# Patient Record
Sex: FEMALE | Race: WHITE | NOT HISPANIC OR LATINO | Employment: OTHER | URBAN - METROPOLITAN AREA
[De-identification: names, ages, dates, MRNs, and addresses within clinical notes are randomized per-mention and may not be internally consistent; named-entity substitution may affect disease eponyms.]

---

## 2023-04-24 ENCOUNTER — OFFICE VISIT (OUTPATIENT)
Dept: PRIMARY CARE | Facility: CLINIC | Age: 88
End: 2023-04-24
Payer: COMMERCIAL

## 2023-04-24 VITALS
HEIGHT: 61 IN | SYSTOLIC BLOOD PRESSURE: 120 MMHG | RESPIRATION RATE: 18 BRPM | OXYGEN SATURATION: 94 % | WEIGHT: 146 LBS | HEART RATE: 83 BPM | BODY MASS INDEX: 27.56 KG/M2 | TEMPERATURE: 97.5 F | DIASTOLIC BLOOD PRESSURE: 70 MMHG

## 2023-04-24 DIAGNOSIS — E11.9 TYPE 2 DIABETES MELLITUS WITHOUT COMPLICATION, UNSPECIFIED WHETHER LONG TERM INSULIN USE (MULTI): Primary | ICD-10-CM

## 2023-04-24 DIAGNOSIS — G89.29 CHRONIC PAIN OF TOE OF RIGHT FOOT: ICD-10-CM

## 2023-04-24 DIAGNOSIS — I10 PRIMARY HYPERTENSION: ICD-10-CM

## 2023-04-24 DIAGNOSIS — M79.674 CHRONIC PAIN OF TOE OF RIGHT FOOT: ICD-10-CM

## 2023-04-24 DIAGNOSIS — M79.675 CHRONIC PAIN OF TOE OF LEFT FOOT: ICD-10-CM

## 2023-04-24 DIAGNOSIS — G89.29 CHRONIC PAIN OF TOE OF LEFT FOOT: ICD-10-CM

## 2023-04-24 DIAGNOSIS — E11.9 TYPE 2 DIABETES MELLITUS WITHOUT RETINOPATHY (MULTI): ICD-10-CM

## 2023-04-24 DIAGNOSIS — J43.8 OTHER EMPHYSEMA (MULTI): ICD-10-CM

## 2023-04-24 PROBLEM — R31.9 HEMATURIA: Status: ACTIVE | Noted: 2023-04-24

## 2023-04-24 PROBLEM — H35.371 EPIRETINAL MEMBRANE, RIGHT EYE: Status: ACTIVE | Noted: 2023-02-20

## 2023-04-24 PROBLEM — K76.0 FATTY LIVER: Status: ACTIVE | Noted: 2023-04-24

## 2023-04-24 PROBLEM — R42 DIZZINESS: Status: ACTIVE | Noted: 2023-04-24

## 2023-04-24 PROBLEM — M85.80 OSTEOPENIA: Status: ACTIVE | Noted: 2023-04-24

## 2023-04-24 PROBLEM — K21.9 GERD (GASTROESOPHAGEAL REFLUX DISEASE): Status: ACTIVE | Noted: 2023-04-24

## 2023-04-24 PROBLEM — J44.9 COPD (CHRONIC OBSTRUCTIVE PULMONARY DISEASE) (MULTI): Status: ACTIVE | Noted: 2023-04-24

## 2023-04-24 PROBLEM — M19.90 OSTEOARTHRITIS: Status: ACTIVE | Noted: 2023-04-24

## 2023-04-24 PROBLEM — M79.603 ARM PAIN: Status: ACTIVE | Noted: 2023-04-24

## 2023-04-24 PROBLEM — J06.9 URI (UPPER RESPIRATORY INFECTION): Status: ACTIVE | Noted: 2023-04-24

## 2023-04-24 PROBLEM — M25.632 STIFFNESS OF LEFT WRIST, NOT ELSEWHERE CLASSIFIED: Status: ACTIVE | Noted: 2020-02-18

## 2023-04-24 PROBLEM — H43.813 VITREOUS DEGENERATION OF BOTH EYES: Status: ACTIVE | Noted: 2023-02-20

## 2023-04-24 PROBLEM — M79.606 PAIN IN LOWER LIMB: Status: ACTIVE | Noted: 2023-04-24

## 2023-04-24 PROBLEM — T14.8XXA: Status: ACTIVE | Noted: 2023-04-24

## 2023-04-24 PROBLEM — M79.602 PAIN OF LEFT UPPER EXTREMITY: Status: ACTIVE | Noted: 2023-04-24

## 2023-04-24 PROBLEM — M54.50 LUMBAGO: Status: ACTIVE | Noted: 2023-04-24

## 2023-04-24 PROBLEM — B35.1 NAIL FUNGUS: Status: ACTIVE | Noted: 2023-04-24

## 2023-04-24 PROBLEM — R25.2 LEG CRAMPS: Status: ACTIVE | Noted: 2023-04-24

## 2023-04-24 PROBLEM — R09.89 CAROTID ARTERY BRUIT: Status: ACTIVE | Noted: 2023-04-24

## 2023-04-24 PROBLEM — R21 RASH AND OTHER NONSPECIFIC SKIN ERUPTION: Status: ACTIVE | Noted: 2023-04-24

## 2023-04-24 PROBLEM — M81.0 OSTEOPOROSIS: Status: ACTIVE | Noted: 2023-04-24

## 2023-04-24 PROBLEM — H35.363: Status: ACTIVE | Noted: 2023-02-20

## 2023-04-24 PROBLEM — Z96.1 PSEUDOPHAKIA: Status: ACTIVE | Noted: 2023-02-20

## 2023-04-24 PROBLEM — R11.2 NAUSEA WITH VOMITING: Status: ACTIVE | Noted: 2023-04-24

## 2023-04-24 PROBLEM — R06.02 SOB (SHORTNESS OF BREATH) ON EXERTION: Status: ACTIVE | Noted: 2023-04-24

## 2023-04-24 PROBLEM — L50.8 URTICARIA, ACUTE: Status: ACTIVE | Noted: 2023-04-24

## 2023-04-24 PROBLEM — R05.9 COUGH: Status: ACTIVE | Noted: 2023-04-24

## 2023-04-24 PROBLEM — E55.9 VITAMIN D DEFICIENCY: Status: ACTIVE | Noted: 2023-04-24

## 2023-04-24 PROBLEM — E78.5 DYSLIPIDEMIA: Status: ACTIVE | Noted: 2023-04-24

## 2023-04-24 PROBLEM — S52.539A CLOSED COLLES' FRACTURE: Status: ACTIVE | Noted: 2019-12-27

## 2023-04-24 LAB — POC HEMOGLOBIN A1C: 7.8 % (ref 4.2–6.5)

## 2023-04-24 PROCEDURE — 83036 HEMOGLOBIN GLYCOSYLATED A1C: CPT | Performed by: INTERNAL MEDICINE

## 2023-04-24 PROCEDURE — 3074F SYST BP LT 130 MM HG: CPT | Performed by: INTERNAL MEDICINE

## 2023-04-24 PROCEDURE — 1159F MED LIST DOCD IN RCRD: CPT | Performed by: INTERNAL MEDICINE

## 2023-04-24 PROCEDURE — 99213 OFFICE O/P EST LOW 20 MIN: CPT | Performed by: INTERNAL MEDICINE

## 2023-04-24 PROCEDURE — 1160F RVW MEDS BY RX/DR IN RCRD: CPT | Performed by: INTERNAL MEDICINE

## 2023-04-24 PROCEDURE — 3078F DIAST BP <80 MM HG: CPT | Performed by: INTERNAL MEDICINE

## 2023-04-24 RX ORDER — INSULIN GLARGINE 100 [IU]/ML
INJECTION, SOLUTION SUBCUTANEOUS
COMMUNITY
End: 2023-07-05 | Stop reason: SDUPTHER

## 2023-04-24 RX ORDER — BLOOD-GLUCOSE METER
EACH MISCELLANEOUS 2 TIMES DAILY
COMMUNITY
End: 2023-08-03 | Stop reason: SDUPTHER

## 2023-04-24 RX ORDER — FLUTICASONE PROPIONATE 50 MCG
SPRAY, SUSPENSION (ML) NASAL
COMMUNITY
Start: 2021-12-02 | End: 2023-07-24 | Stop reason: SDUPTHER

## 2023-04-24 RX ORDER — PEN NEEDLE, DIABETIC 31 GX5/16"
NEEDLE, DISPOSABLE MISCELLANEOUS
COMMUNITY
Start: 2022-12-28

## 2023-04-24 RX ORDER — METFORMIN HYDROCHLORIDE 1000 MG/1
1000 TABLET ORAL
COMMUNITY
Start: 2019-10-04 | End: 2023-07-24 | Stop reason: SDUPTHER

## 2023-04-24 RX ORDER — SIMVASTATIN 20 MG/1
TABLET, FILM COATED ORAL
COMMUNITY
Start: 2019-09-11 | End: 2023-07-24 | Stop reason: SDUPTHER

## 2023-04-24 RX ORDER — ACETAMINOPHEN 500 MG
TABLET ORAL
COMMUNITY

## 2023-04-24 RX ORDER — ALBUTEROL SULFATE 90 UG/1
AEROSOL, METERED RESPIRATORY (INHALATION)
COMMUNITY
Start: 2023-02-10 | End: 2023-12-18

## 2023-04-24 RX ORDER — ESOMEPRAZOLE MAGNESIUM 40 MG/1
CAPSULE, DELAYED RELEASE ORAL
COMMUNITY

## 2023-04-24 RX ORDER — FLUTICASONE PROPIONATE AND SALMETEROL 250; 50 UG/1; UG/1
POWDER RESPIRATORY (INHALATION) EVERY 12 HOURS
COMMUNITY
End: 2023-07-24 | Stop reason: SDUPTHER

## 2023-04-24 RX ORDER — LISINOPRIL 10 MG/1
TABLET ORAL EVERY 24 HOURS
COMMUNITY
End: 2023-07-24 | Stop reason: SDUPTHER

## 2023-04-24 RX ORDER — FAMOTIDINE 20 MG/1
TABLET, FILM COATED ORAL
COMMUNITY
Start: 2020-06-01 | End: 2023-04-24 | Stop reason: WASHOUT

## 2023-04-24 RX ORDER — CALCIUM CARBONATE/VITAMIN D3 600MG-5MCG
TABLET ORAL
COMMUNITY
End: 2024-06-03 | Stop reason: SDUPTHER

## 2023-04-24 RX ORDER — LIDOCAINE HCL 4 G/100ML
LIQUID TOPICAL
COMMUNITY
End: 2024-02-12 | Stop reason: ALTCHOICE

## 2023-04-24 ASSESSMENT — ENCOUNTER SYMPTOMS
COUGH: 1
CHILLS: 0
WEAKNESS: 0
WHEEZING: 1
UNEXPECTED WEIGHT CHANGE: 0
CHEST TIGHTNESS: 0
NAUSEA: 0
ARTHRALGIAS: 0
DIZZINESS: 1
CONSTIPATION: 0
HEADACHES: 1
PALPITATIONS: 0
ADENOPATHY: 0
SORE THROAT: 0
ABDOMINAL PAIN: 0
VOMITING: 0
SLEEP DISTURBANCE: 0
SHORTNESS OF BREATH: 1
DYSURIA: 0
FATIGUE: 1
DIARRHEA: 0
JOINT SWELLING: 0
CONFUSION: 0
SEIZURES: 0

## 2023-04-24 ASSESSMENT — PATIENT HEALTH QUESTIONNAIRE - PHQ9
1. LITTLE INTEREST OR PLEASURE IN DOING THINGS: NOT AT ALL
SUM OF ALL RESPONSES TO PHQ9 QUESTIONS 1 AND 2: 0
2. FEELING DOWN, DEPRESSED OR HOPELESS: NOT AT ALL

## 2023-04-24 NOTE — PROGRESS NOTES
Subjective   Lindsay Hauser is a 88 y.o. female who presents for Follow-up and Diabetes (Follow up DM- checking daily ,BID , 140s, 90 lowest , 190 highest).  Follow up DM- checking BG at home BID  Last HGBA1C was 7.8 on 1.10.23, today is 7.8  Headaches-took advil dizziness, R upper abd area sharp pain  1 week ago daily ,took nexium and helped  L foot edema on/off x 6 weeks , better today  only toes swelling now , bilateral big toes red and swelling , unknown reason            Diabetes  She presents for her follow-up diabetic visit. She has type 2 diabetes mellitus. Her disease course has been stable. Hypoglycemia symptoms include dizziness and headaches. Pertinent negatives for hypoglycemia include no confusion or seizures. Associated symptoms include fatigue. Pertinent negatives for diabetes include no weakness.       Review of Systems   Constitutional:  Positive for fatigue. Negative for chills and unexpected weight change.        Comment   HENT:  Negative for congestion, ear pain and sore throat.    Respiratory:  Positive for cough, shortness of breath and wheezing. Negative for chest tightness.    Cardiovascular:  Positive for leg swelling. Negative for palpitations.   Gastrointestinal:  Negative for abdominal pain, constipation, diarrhea, nausea and vomiting.   Genitourinary:  Negative for dysuria and urgency.   Musculoskeletal:  Negative for arthralgias and joint swelling.   Skin:  Negative for rash.   Neurological:  Positive for dizziness and headaches. Negative for seizures and weakness.   Hematological:  Negative for adenopathy.   Psychiatric/Behavioral:  Negative for confusion and sleep disturbance.        Objective   Physical Exam  Constitutional:       Appearance: Normal appearance.   HENT:      Head: Normocephalic and atraumatic.   Eyes:      Conjunctiva/sclera: Conjunctivae normal.   Neck:      Vascular: No carotid bruit.   Cardiovascular:      Rate and Rhythm: Normal rate and regular rhythm.      Heart  "sounds: No murmur heard.  Pulmonary:      Effort: No respiratory distress.      Breath sounds: No wheezing, rhonchi or rales.   Chest:      Chest wall: No tenderness.   Abdominal:      General: Bowel sounds are normal. There is no distension.      Palpations: Abdomen is soft. There is no mass.      Tenderness: There is no abdominal tenderness.   Musculoskeletal:         General: Normal range of motion.      Cervical back: Neck supple.   Feet:      Right foot:      Skin integrity: Skin integrity normal.      Toenail Condition: Right toenails are abnormally thick. Fungal disease present.     Left foot:      Skin integrity: Skin integrity normal.      Toenail Condition: Left toenails are abnormally thick. Fungal disease present.     Comments: Mild left leg edema ,foot level  Lymphadenopathy:      Cervical: No cervical adenopathy.   Skin:     Coloration: Skin is not jaundiced.      Findings: No rash.   Neurological:      General: No focal deficit present.      Mental Status: She is alert and oriented to person, place, and time. Mental status is at baseline.      Motor: No weakness.      Gait: Gait normal.   Psychiatric:         Mood and Affect: Mood normal.         Behavior: Behavior normal.         Judgment: Judgment normal.       /70 (BP Location: Left arm, Patient Position: Sitting)   Pulse 83   Temp 36.4 °C (97.5 °F)   Resp 18   Ht 1.549 m (5' 1\")   Wt 66.2 kg (146 lb)   SpO2 94% Comment: RA  BMI 27.59 kg/m²         Assessment/Plan   Problem List Items Addressed This Visit       Chronic pain of toe of left foot    Chronic pain of toe of right foot    COPD (chronic obstructive pulmonary disease) (CMS/[facility])     Stable on current med         Hypertension    Type 2 diabetes mellitus without retinopathy (CMS/[facility])     Other Visit Diagnoses       Type 2 diabetes mellitus without complication, unspecified whether long term insulin use (CMS/[facility])    -  Primary             "

## 2023-07-05 ENCOUNTER — TELEPHONE (OUTPATIENT)
Dept: PRIMARY CARE | Facility: CLINIC | Age: 88
End: 2023-07-05
Payer: COMMERCIAL

## 2023-07-05 DIAGNOSIS — E11.9 TYPE 2 DIABETES MELLITUS WITHOUT RETINOPATHY (MULTI): ICD-10-CM

## 2023-07-05 RX ORDER — INSULIN GLARGINE 100 [IU]/ML
INJECTION, SOLUTION SUBCUTANEOUS
Qty: 3 ML | Refills: 1 | Status: SHIPPED | OUTPATIENT
Start: 2023-07-05 | End: 2023-07-24 | Stop reason: SDUPTHER

## 2023-07-05 NOTE — TELEPHONE ENCOUNTER
Rx Refill Request Telephone Encounter    Name:  Lindsay Hauser  :  [id number]  Medication Name:  lantus solostar pen  Dose : 100 units (3ml)  Route : under the skin  Frequency : daily  Directions : inject 28 units subcutaneously at bedtime  Specific Pharmacy location:  [facility]  Date of last appointment:  2023  Date of next appointment:  2023  Best number to reach patient:  [phone]

## 2023-07-24 ENCOUNTER — OFFICE VISIT (OUTPATIENT)
Dept: PRIMARY CARE | Facility: CLINIC | Age: 88
End: 2023-07-24
Payer: COMMERCIAL

## 2023-07-24 VITALS
BODY MASS INDEX: 27.26 KG/M2 | DIASTOLIC BLOOD PRESSURE: 62 MMHG | RESPIRATION RATE: 18 BRPM | TEMPERATURE: 97.2 F | OXYGEN SATURATION: 90 % | HEIGHT: 61 IN | HEART RATE: 78 BPM | WEIGHT: 144.4 LBS | SYSTOLIC BLOOD PRESSURE: 120 MMHG

## 2023-07-24 DIAGNOSIS — Z00.00 MEDICARE ANNUAL WELLNESS VISIT, SUBSEQUENT: Primary | ICD-10-CM

## 2023-07-24 DIAGNOSIS — H61.20 WAX IN EAR: ICD-10-CM

## 2023-07-24 DIAGNOSIS — I10 PRIMARY HYPERTENSION: ICD-10-CM

## 2023-07-24 DIAGNOSIS — J41.0 SIMPLE CHRONIC BRONCHITIS (MULTI): ICD-10-CM

## 2023-07-24 DIAGNOSIS — E11.9 TYPE 2 DIABETES MELLITUS WITHOUT COMPLICATION, UNSPECIFIED WHETHER LONG TERM INSULIN USE (MULTI): ICD-10-CM

## 2023-07-24 DIAGNOSIS — E11.9 TYPE 2 DIABETES MELLITUS WITHOUT RETINOPATHY (MULTI): ICD-10-CM

## 2023-07-24 DIAGNOSIS — E78.5 DYSLIPIDEMIA: ICD-10-CM

## 2023-07-24 PROBLEM — J06.9 URI (UPPER RESPIRATORY INFECTION): Status: RESOLVED | Noted: 2023-04-24 | Resolved: 2023-07-24

## 2023-07-24 PROBLEM — R21 RASH AND OTHER NONSPECIFIC SKIN ERUPTION: Status: RESOLVED | Noted: 2023-04-24 | Resolved: 2023-07-24

## 2023-07-24 PROBLEM — L50.8 URTICARIA, ACUTE: Status: RESOLVED | Noted: 2023-04-24 | Resolved: 2023-07-24

## 2023-07-24 PROBLEM — R05.9 COUGH: Status: RESOLVED | Noted: 2023-04-24 | Resolved: 2023-07-24

## 2023-07-24 LAB — POC HEMOGLOBIN A1C: 7.6 % (ref 4.2–6.5)

## 2023-07-24 PROCEDURE — 99497 ADVNCD CARE PLAN 30 MIN: CPT | Performed by: INTERNAL MEDICINE

## 2023-07-24 PROCEDURE — 1159F MED LIST DOCD IN RCRD: CPT | Performed by: INTERNAL MEDICINE

## 2023-07-24 PROCEDURE — 3078F DIAST BP <80 MM HG: CPT | Performed by: INTERNAL MEDICINE

## 2023-07-24 PROCEDURE — 1170F FXNL STATUS ASSESSED: CPT | Performed by: INTERNAL MEDICINE

## 2023-07-24 PROCEDURE — 83036 HEMOGLOBIN GLYCOSYLATED A1C: CPT | Performed by: INTERNAL MEDICINE

## 2023-07-24 PROCEDURE — G0439 PPPS, SUBSEQ VISIT: HCPCS | Performed by: INTERNAL MEDICINE

## 2023-07-24 PROCEDURE — G0444 DEPRESSION SCREEN ANNUAL: HCPCS | Performed by: INTERNAL MEDICINE

## 2023-07-24 PROCEDURE — 3074F SYST BP LT 130 MM HG: CPT | Performed by: INTERNAL MEDICINE

## 2023-07-24 PROCEDURE — 1160F RVW MEDS BY RX/DR IN RCRD: CPT | Performed by: INTERNAL MEDICINE

## 2023-07-24 PROCEDURE — 99213 OFFICE O/P EST LOW 20 MIN: CPT | Performed by: INTERNAL MEDICINE

## 2023-07-24 RX ORDER — FLUTICASONE PROPIONATE AND SALMETEROL 250; 50 UG/1; UG/1
1 POWDER RESPIRATORY (INHALATION) EVERY 12 HOURS
Qty: 60 EACH | Refills: 11 | Status: SHIPPED | OUTPATIENT
Start: 2023-07-24

## 2023-07-24 RX ORDER — LISINOPRIL 10 MG/1
10 TABLET ORAL EVERY 24 HOURS
Qty: 30 TABLET | Refills: 11 | Status: SHIPPED | OUTPATIENT
Start: 2023-07-24

## 2023-07-24 RX ORDER — INSULIN GLARGINE 100 [IU]/ML
INJECTION, SOLUTION SUBCUTANEOUS
Qty: 3 ML | Refills: 11 | Status: SHIPPED | OUTPATIENT
Start: 2023-07-24 | End: 2023-10-30 | Stop reason: SDUPTHER

## 2023-07-24 RX ORDER — FLUTICASONE PROPIONATE 50 MCG
1 SPRAY, SUSPENSION (ML) NASAL
Qty: 16 G | Refills: 11 | Status: SHIPPED | OUTPATIENT
Start: 2023-07-24 | End: 2024-06-03 | Stop reason: SDUPTHER

## 2023-07-24 RX ORDER — SIMVASTATIN 20 MG/1
TABLET, FILM COATED ORAL
Qty: 30 TABLET | Refills: 11 | Status: SHIPPED | OUTPATIENT
Start: 2023-07-24 | End: 2023-07-27 | Stop reason: SDUPTHER

## 2023-07-24 RX ORDER — METFORMIN HYDROCHLORIDE 1000 MG/1
1000 TABLET ORAL
Qty: 60 TABLET | Refills: 11 | Status: SHIPPED | OUTPATIENT
Start: 2023-07-24

## 2023-07-24 ASSESSMENT — ENCOUNTER SYMPTOMS
DIARRHEA: 0
SHORTNESS OF BREATH: 1
CHILLS: 0
COUGH: 1
UNEXPECTED WEIGHT CHANGE: 0
PALPITATIONS: 0
CHEST TIGHTNESS: 0
ARTHRALGIAS: 0
CONFUSION: 0
NAUSEA: 0
DYSURIA: 0
JOINT SWELLING: 0
SORE THROAT: 0
VOMITING: 0
ADENOPATHY: 0
WHEEZING: 1
FATIGUE: 1
DIZZINESS: 0
CONSTIPATION: 0
SLEEP DISTURBANCE: 1
ABDOMINAL PAIN: 0
WEAKNESS: 0
HEADACHES: 1

## 2023-07-24 ASSESSMENT — ACTIVITIES OF DAILY LIVING (ADL)
DOING_HOUSEWORK: NEEDS ASSISTANCE
BATHING: INDEPENDENT
GROCERY_SHOPPING: NEEDS ASSISTANCE
DRESSING: INDEPENDENT
TAKING_MEDICATION: INDEPENDENT
MANAGING_FINANCES: NEEDS ASSISTANCE

## 2023-07-24 ASSESSMENT — PATIENT HEALTH QUESTIONNAIRE - PHQ9
SUM OF ALL RESPONSES TO PHQ9 QUESTIONS 1 AND 2: 0
1. LITTLE INTEREST OR PLEASURE IN DOING THINGS: NOT AT ALL
2. FEELING DOWN, DEPRESSED OR HOPELESS: NOT AT ALL

## 2023-07-24 NOTE — PROGRESS NOTES
"Subjective   Reason for Visit: Lindsay Hauser is an 88 y.o. female here for a Medicare Wellness visit.     Past Medical, Surgical, and Family History reviewed and updated in chart.    Reviewed all medications by prescribing practitioner or clinical pharmacist (such as prescriptions, OTCs, herbal therapies and supplements) and documented in the medical record.    AWV  DM - checking BG at home BID states is averaging    150s-180s  , last HGBA1C was 7.8 on 4.24.23, today is 7.6  Needs all RX  to be sent to mail order pharmacy please   Dexa- 3.8.2022  Has Oxygen at home to us PRN- 2L  Patient decline to have mammo and colonoscopy   Daughter Halley is in today with patient         Patient Care Team:  Nilson Berumen MD as PCP - General     Review of Systems   Constitutional:  Positive for fatigue. Negative for chills and unexpected weight change.        Comment   HENT:  Negative for congestion, ear pain and sore throat.    Respiratory:  Positive for cough, shortness of breath and wheezing. Negative for chest tightness.    Cardiovascular:  Positive for leg swelling. Negative for palpitations.   Gastrointestinal:  Negative for abdominal pain, constipation, diarrhea, nausea and vomiting.   Genitourinary:  Negative for dysuria and urgency.        Urinary incontinence   Musculoskeletal:  Negative for arthralgias and joint swelling.   Skin:  Positive for rash.   Neurological:  Positive for headaches. Negative for dizziness and weakness.   Hematological:  Negative for adenopathy.   Psychiatric/Behavioral:  Positive for sleep disturbance. Negative for confusion.    All other systems reviewed and are negative.      Objective   Vitals:  /62 (BP Location: Left arm, Patient Position: Sitting)   Pulse 78   Temp 36.2 °C (97.2 °F)   Resp 18   Ht 1.549 m (5' 1\")   Wt 65.5 kg (144 lb 6.4 oz)   SpO2 90% Comment: RA  BMI 27.28 kg/m²       Physical Exam  Constitutional:       Appearance: Normal appearance.   HENT:      Head: " Normocephalic and atraumatic.   Eyes:      Pupils: Pupils are equal, round, and reactive to light.   Cardiovascular:      Rate and Rhythm: Normal rate and regular rhythm.   Pulmonary:      Effort: Pulmonary effort is normal.      Breath sounds: Normal breath sounds.   Musculoskeletal:         General: Normal range of motion.      Cervical back: Normal range of motion and neck supple.      Right foot: No deformity, bunion or Charcot foot.      Left foot: No deformity, bunion or Charcot foot.   Feet:      Right foot:      Skin integrity: Skin integrity normal. No ulcer, callus or fissure.      Toenail Condition: Right toenails are normal.      Left foot:      Skin integrity: Skin integrity normal. No ulcer, callus or fissure.      Toenail Condition: Left toenails are normal.      Comments: Foot exam normal,   Skin:     General: Skin is warm.   Neurological:      General: No focal deficit present.      Mental Status: She is alert and oriented to person, place, and time.   Psychiatric:         Mood and Affect: Mood normal.         Behavior: Behavior normal.         Assessment/Plan   Problem List Items Addressed This Visit       COPD (chronic obstructive pulmonary disease) (CMS/[facility])    Overview     COPD (chronic obstructive pulmonary disease);         Relevant Medications    fluticasone (Flonase) 50 mcg/actuation nasal spray    fluticasone propion-salmeteroL (Advair Diskus) 250-50 mcg/dose diskus inhaler    Dyslipidemia    Overview     ldl 110, hdl 48;         Relevant Medications    simvastatin (Zocor) 20 mg tablet    Hypertension    Relevant Medications    lisinopril 10 mg tablet    Type 2 diabetes mellitus without retinopathy (CMS/[facility])    Overview     hba1c 7.3,  7.5, 7.6, 6.9, 7.3, 8.3, 8; hba1c 7.3,  7.5, 7.6, 6.9, 7.3, 8.3, 8,7; hba1c 7.3,  7.5, 7.6, 6.9, 7.3, 8.3, 8,7, 7.8;         Relevant Medications    insulin glargine (Lantus Solostar U-100 Insulin) 100 unit/mL (3 mL) pen    metFORMIN (Glucophage) 1,000 mg  tablet    Type 2 diabetes mellitus (CMS/[facility])    Overview     Comment on above: hba1c 7.3, 7.5, 7.6, 6.9, 7.3, 8.3, 8;  hba1c 7.3, 7.5, 7.6, 6.9, 7.3, 8.3, 8,7;  hba1c 7.3, 7.5, 7.6, 6.9, 7.3, 8.3, 8,7, 7.8;         Current Assessment & Plan     Hba1c 7.6         Relevant Orders    POCT glycosylated hemoglobin (Hb A1C) manually resulted    Medicare annual wellness visit, subsequent - Primary

## 2023-07-27 ENCOUNTER — TELEPHONE (OUTPATIENT)
Dept: PRIMARY CARE | Facility: CLINIC | Age: 88
End: 2023-07-27
Payer: COMMERCIAL

## 2023-07-27 DIAGNOSIS — E78.5 DYSLIPIDEMIA: ICD-10-CM

## 2023-07-27 RX ORDER — SIMVASTATIN 20 MG/1
TABLET, FILM COATED ORAL
Qty: 30 TABLET | Refills: 11 | Status: SHIPPED | OUTPATIENT
Start: 2023-07-27 | End: 2023-07-28 | Stop reason: SDUPTHER

## 2023-07-27 RX ORDER — SIMVASTATIN 20 MG/1
20 TABLET, FILM COATED ORAL DAILY
Qty: 30 TABLET | Refills: 11 | Status: CANCELLED | OUTPATIENT
Start: 2023-07-27

## 2023-07-27 NOTE — TELEPHONE ENCOUNTER
[facility] needs clarification on Simvastatin Rx that came over today.  There were no instructions on this Rx. Please call Pharmacist Jerrica at [phone]

## 2023-07-28 DIAGNOSIS — E78.5 DYSLIPIDEMIA: Primary | ICD-10-CM

## 2023-07-28 RX ORDER — SIMVASTATIN 20 MG/1
20 TABLET, FILM COATED ORAL NIGHTLY
Qty: 30 TABLET | Refills: 11 | Status: SHIPPED | OUTPATIENT
Start: 2023-07-28 | End: 2023-08-02 | Stop reason: SDUPTHER

## 2023-07-31 ENCOUNTER — APPOINTMENT (OUTPATIENT)
Dept: PRIMARY CARE | Facility: CLINIC | Age: 88
End: 2023-07-31
Payer: COMMERCIAL

## 2023-08-02 ENCOUNTER — TELEPHONE (OUTPATIENT)
Dept: PRIMARY CARE | Facility: CLINIC | Age: 88
End: 2023-08-02
Payer: COMMERCIAL

## 2023-08-02 DIAGNOSIS — E78.5 DYSLIPIDEMIA: Primary | ICD-10-CM

## 2023-08-03 DIAGNOSIS — E11.9 TYPE 2 DIABETES MELLITUS WITHOUT COMPLICATION, UNSPECIFIED WHETHER LONG TERM INSULIN USE (MULTI): Primary | ICD-10-CM

## 2023-08-03 RX ORDER — SIMVASTATIN 20 MG/1
20 TABLET, FILM COATED ORAL NIGHTLY
Qty: 90 TABLET | Refills: 3 | Status: SHIPPED | OUTPATIENT
Start: 2023-08-03

## 2023-08-04 RX ORDER — BLOOD-GLUCOSE METER
1 EACH MISCELLANEOUS 2 TIMES DAILY
Qty: 100 STRIP | Refills: 3 | Status: SHIPPED | OUTPATIENT
Start: 2023-08-04 | End: 2023-11-09

## 2023-08-07 ENCOUNTER — OFFICE VISIT (OUTPATIENT)
Dept: PRIMARY CARE | Facility: CLINIC | Age: 88
End: 2023-08-07
Payer: COMMERCIAL

## 2023-08-07 VITALS
SYSTOLIC BLOOD PRESSURE: 128 MMHG | HEART RATE: 72 BPM | BODY MASS INDEX: 27.08 KG/M2 | TEMPERATURE: 97 F | HEIGHT: 61 IN | DIASTOLIC BLOOD PRESSURE: 60 MMHG | WEIGHT: 143.4 LBS

## 2023-08-07 DIAGNOSIS — H61.20 WAX IN EAR: Primary | ICD-10-CM

## 2023-08-07 PROCEDURE — 3074F SYST BP LT 130 MM HG: CPT | Performed by: INTERNAL MEDICINE

## 2023-08-07 PROCEDURE — 1159F MED LIST DOCD IN RCRD: CPT | Performed by: INTERNAL MEDICINE

## 2023-08-07 PROCEDURE — 3078F DIAST BP <80 MM HG: CPT | Performed by: INTERNAL MEDICINE

## 2023-08-07 PROCEDURE — 69209 REMOVE IMPACTED EAR WAX UNI: CPT | Performed by: INTERNAL MEDICINE

## 2023-08-07 PROCEDURE — 99212 OFFICE O/P EST SF 10 MIN: CPT | Performed by: INTERNAL MEDICINE

## 2023-08-07 PROCEDURE — 1160F RVW MEDS BY RX/DR IN RCRD: CPT | Performed by: INTERNAL MEDICINE

## 2023-08-07 ASSESSMENT — ENCOUNTER SYMPTOMS
COUGH: 1
HEADACHES: 1
CARDIOVASCULAR NEGATIVE: 1
DIZZINESS: 0
SHORTNESS OF BREATH: 1
FATIGUE: 1

## 2023-08-07 ASSESSMENT — PATIENT HEALTH QUESTIONNAIRE - PHQ9
2. FEELING DOWN, DEPRESSED OR HOPELESS: NOT AT ALL
1. LITTLE INTEREST OR PLEASURE IN DOING THINGS: NOT AT ALL
SUM OF ALL RESPONSES TO PHQ9 QUESTIONS 1 AND 2: 0

## 2023-08-07 NOTE — PROGRESS NOTES
"Subjective   Lindsay Hauser is a 88 y.o. female who presents for Cerumen Impaction ( L ear wax).  Patient here today for L ear wax removal   Daughter in the room with patient       Review of Systems   Constitutional:  Positive for fatigue.   Respiratory:  Positive for cough and shortness of breath.    Cardiovascular: Negative.    Neurological:  Positive for headaches. Negative for dizziness.       Objective   Physical Exam  Constitutional:       Comments: Left ear wax, dry , deep     Patient ID: Lindsay Hauser is a 88 y.o. female.    Ear Cerumen Removal    Date/Time: 8/7/2023 4:20 PM    Performed by: Nilson Berumen MD  Authorized by: Nilson Berumen MD    Consent:     Consent obtained:  Verbal    Consent given by:  Patient    Risks, benefits, and alternatives were discussed: yes      Risks discussed:  Bleeding, infection, pain, dizziness, incomplete removal and TM perforation    Alternatives discussed:  No treatment and referral  Universal protocol:     Procedure explained and questions answered to patient or proxy's satisfaction: yes      Patient identity confirmed:  Verbally with patient  Procedure details:     Location:  L ear    Procedure type: irrigation      Procedure outcomes: unable to remove cerumen    Post-procedure details:     Inspection:  Some cerumen remaining    Hearing quality:  Diminished    Procedure completion:  Procedure terminated electively by provider  Comments:      Referral to ent, continue ear drops    /60 (BP Location: Left arm, Patient Position: Sitting)   Pulse 72   Temp 36.1 °C (97 °F)   Ht 1.549 m (5' 1\")   Wt 65 kg (143 lb 6.4 oz)   BMI 27.10 kg/m²       Assessment/Plan   Problem List Items Addressed This Visit       Wax in ear - Primary    Relevant Orders    Referral to ENT       "

## 2023-09-27 PROBLEM — Z87.891 FORMER CIGAR SMOKER: Status: ACTIVE | Noted: 2023-09-27

## 2023-09-27 RX ORDER — DOXYCYCLINE 100 MG/1
100 CAPSULE ORAL 2 TIMES DAILY
COMMUNITY
Start: 2023-01-09 | End: 2024-02-12 | Stop reason: ALTCHOICE

## 2023-09-27 RX ORDER — TRAMADOL HYDROCHLORIDE 50 MG/1
50 TABLET ORAL 3 TIMES DAILY PRN
COMMUNITY
Start: 2020-03-23 | End: 2024-02-12 | Stop reason: ALTCHOICE

## 2023-09-27 RX ORDER — NYSTATIN AND TRIAMCINOLONE ACETONIDE 100000; 1 [USP'U]/G; MG/G
OINTMENT TOPICAL 3 TIMES DAILY
COMMUNITY
Start: 2021-08-19 | End: 2024-06-03 | Stop reason: SDUPTHER

## 2023-09-27 RX ORDER — FAMOTIDINE 20 MG/1
20 TABLET, FILM COATED ORAL DAILY
COMMUNITY
Start: 2020-06-01

## 2023-10-02 ENCOUNTER — APPOINTMENT (OUTPATIENT)
Dept: PRIMARY CARE | Facility: CLINIC | Age: 88
End: 2023-10-02
Payer: COMMERCIAL

## 2023-10-30 ENCOUNTER — OFFICE VISIT (OUTPATIENT)
Dept: PRIMARY CARE | Facility: CLINIC | Age: 88
End: 2023-10-30
Payer: COMMERCIAL

## 2023-10-30 ENCOUNTER — APPOINTMENT (OUTPATIENT)
Dept: PRIMARY CARE | Facility: CLINIC | Age: 88
End: 2023-10-30
Payer: COMMERCIAL

## 2023-10-30 VITALS
BODY MASS INDEX: 27.94 KG/M2 | TEMPERATURE: 98.1 F | HEART RATE: 91 BPM | WEIGHT: 148 LBS | HEIGHT: 61 IN | SYSTOLIC BLOOD PRESSURE: 138 MMHG | DIASTOLIC BLOOD PRESSURE: 60 MMHG | OXYGEN SATURATION: 93 % | RESPIRATION RATE: 16 BRPM

## 2023-10-30 DIAGNOSIS — E78.5 DYSLIPIDEMIA: ICD-10-CM

## 2023-10-30 DIAGNOSIS — E11.9 TYPE 2 DIABETES MELLITUS WITHOUT RETINOPATHY (MULTI): Primary | ICD-10-CM

## 2023-10-30 DIAGNOSIS — I10 PRIMARY HYPERTENSION: ICD-10-CM

## 2023-10-30 DIAGNOSIS — E55.9 VITAMIN D DEFICIENCY: ICD-10-CM

## 2023-10-30 DIAGNOSIS — E11.22 TYPE 2 DIABETES MELLITUS WITH DIABETIC CHRONIC KIDNEY DISEASE, UNSPECIFIED CKD STAGE, UNSPECIFIED WHETHER LONG TERM INSULIN USE (MULTI): ICD-10-CM

## 2023-10-30 LAB — POC HEMOGLOBIN A1C: 8.5 % (ref 4.2–6.5)

## 2023-10-30 PROCEDURE — 99213 OFFICE O/P EST LOW 20 MIN: CPT | Performed by: INTERNAL MEDICINE

## 2023-10-30 PROCEDURE — 1159F MED LIST DOCD IN RCRD: CPT | Performed by: INTERNAL MEDICINE

## 2023-10-30 PROCEDURE — 83036 HEMOGLOBIN GLYCOSYLATED A1C: CPT | Performed by: INTERNAL MEDICINE

## 2023-10-30 PROCEDURE — 3075F SYST BP GE 130 - 139MM HG: CPT | Performed by: INTERNAL MEDICINE

## 2023-10-30 PROCEDURE — 3078F DIAST BP <80 MM HG: CPT | Performed by: INTERNAL MEDICINE

## 2023-10-30 PROCEDURE — 1160F RVW MEDS BY RX/DR IN RCRD: CPT | Performed by: INTERNAL MEDICINE

## 2023-10-30 RX ORDER — INSULIN GLARGINE 100 [IU]/ML
INJECTION, SOLUTION SUBCUTANEOUS
Qty: 3 ML | Refills: 11 | Status: SHIPPED | OUTPATIENT
Start: 2023-10-30 | End: 2024-02-29

## 2023-10-30 ASSESSMENT — ENCOUNTER SYMPTOMS
DIZZINESS: 0
SORE THROAT: 0
BLURRED VISION: 0
UNEXPECTED WEIGHT CHANGE: 0
COUGH: 0
DIARRHEA: 0
WEAKNESS: 0
VOMITING: 0
ARTHRALGIAS: 0
ADENOPATHY: 0
JOINT SWELLING: 0
WHEEZING: 0
NAUSEA: 0
SHORTNESS OF BREATH: 0
FATIGUE: 0
PALPITATIONS: 0
CONFUSION: 0
CHEST TIGHTNESS: 0
CONSTIPATION: 0
HYPERTENSION: 1
DYSURIA: 0
CHILLS: 0
SLEEP DISTURBANCE: 0
ABDOMINAL PAIN: 0

## 2023-10-30 ASSESSMENT — PATIENT HEALTH QUESTIONNAIRE - PHQ9
1. LITTLE INTEREST OR PLEASURE IN DOING THINGS: NOT AT ALL
2. FEELING DOWN, DEPRESSED OR HOPELESS: NOT AT ALL
SUM OF ALL RESPONSES TO PHQ9 QUESTIONS 1 AND 2: 0

## 2023-10-30 NOTE — PROGRESS NOTES
Subjective   Lindsay Hauser is a 88 y.o. female who presents for Diabetes and Hypertension.  Patient Last A1C 7.6%-07.24.23  Patient A1C Today is  Feels well, no sob, using o2 nc on/off  Medication rev, no side effects      Diabetes  She presents for her follow-up diabetic visit. She has type 2 diabetes mellitus. Her disease course has been stable. Pertinent negatives for hypoglycemia include no confusion or dizziness. Pertinent negatives for diabetes include no blurred vision, no chest pain, no fatigue and no weakness. Symptoms are stable.   Hypertension  This is a recurrent problem. The problem is controlled. Pertinent negatives include no blurred vision, chest pain, palpitations or shortness of breath.     Review of Systems   Constitutional:  Negative for chills, fatigue and unexpected weight change.        Comment   HENT:  Negative for congestion, ear pain and sore throat.    Eyes:  Negative for blurred vision.   Respiratory:  Negative for cough, chest tightness, shortness of breath and wheezing.    Cardiovascular:  Negative for chest pain, palpitations and leg swelling.   Gastrointestinal:  Negative for abdominal pain, constipation, diarrhea, nausea and vomiting.   Genitourinary:  Negative for dysuria and urgency.   Musculoskeletal:  Negative for arthralgias and joint swelling.   Skin:  Negative for rash.   Neurological:  Negative for dizziness and weakness.   Hematological:  Negative for adenopathy.   Psychiatric/Behavioral:  Negative for confusion and sleep disturbance.        Objective   Physical Exam  Constitutional:       Appearance: Normal appearance.   HENT:      Head: Normocephalic and atraumatic.   Eyes:      Pupils: Pupils are equal, round, and reactive to light.   Cardiovascular:      Rate and Rhythm: Normal rate and regular rhythm.   Pulmonary:      Effort: Pulmonary effort is normal.      Breath sounds: Normal breath sounds.   Musculoskeletal:         General: Normal range of motion.      Cervical  "back: Normal range of motion and neck supple.      Right foot: No deformity, bunion or Charcot foot.      Left foot: No deformity, bunion or Charcot foot.   Feet:      Right foot:      Skin integrity: Skin integrity normal. No ulcer, callus or fissure.      Toenail Condition: Right toenails are normal.      Left foot:      Skin integrity: Skin integrity normal. No ulcer, callus or fissure.      Toenail Condition: Left toenails are normal.      Comments: Foot exam normal,   Skin:     General: Skin is warm.   Neurological:      General: No focal deficit present.      Mental Status: She is alert and oriented to person, place, and time.   Psychiatric:         Mood and Affect: Mood normal.         Behavior: Behavior normal.       /60 (BP Location: Left arm, Patient Position: Sitting)   Pulse 91   Temp 36.7 °C (98.1 °F)   Resp 16   Ht 1.549 m (5' 1\")   Wt 67.1 kg (148 lb)   SpO2 93%   BMI 27.96 kg/m²       Assessment/Plan   Problem List Items Addressed This Visit       Dyslipidemia    Hypertension     Fair controlled         Type 2 diabetes mellitus without retinopathy (CMS/[facility]) - Primary    Relevant Orders    POCT glycosylated hemoglobin (Hb A1C) manually resulted (Completed)    Type 2 diabetes mellitus (CMS/[facility])     Hba1c 8.4 from 7.6  On lantus 28 in pm, will increase to 30 ui            "

## 2023-10-30 NOTE — PATIENT INSTRUCTIONS
Was nice seeing you today.  Continue same medication. But increase Lantuss to 30 ui in pm.Have lab work done before next appointment if labs were ordered today.  Fu in 3 month.  Call/ contact our office with any concerns.

## 2023-11-06 ENCOUNTER — PROCEDURE VISIT (OUTPATIENT)
Dept: PODIATRY | Facility: CLINIC | Age: 88
End: 2023-11-06
Payer: COMMERCIAL

## 2023-11-06 DIAGNOSIS — M79.674 CHRONIC PAIN OF TOE OF RIGHT FOOT: ICD-10-CM

## 2023-11-06 DIAGNOSIS — G89.29 CHRONIC PAIN OF TOE OF LEFT FOOT: Primary | ICD-10-CM

## 2023-11-06 DIAGNOSIS — G89.29 CHRONIC PAIN OF TOE OF RIGHT FOOT: ICD-10-CM

## 2023-11-06 DIAGNOSIS — M79.675 CHRONIC PAIN OF TOE OF LEFT FOOT: Primary | ICD-10-CM

## 2023-11-06 DIAGNOSIS — B35.1 NAIL FUNGUS: ICD-10-CM

## 2023-11-06 PROCEDURE — 11721 DEBRIDE NAIL 6 OR MORE: CPT | Performed by: PODIATRIST

## 2023-11-06 NOTE — PROGRESS NOTES
"History Of Present Illness  Lindsay Hauser is a 88 y.o. female presenting for diabetic nail care. Patient complains with painful elongated nails.     Past Medical History  She has no past medical history on file.    Surgical History  She has a past surgical history that includes Other surgical history (09/09/2019) and Other surgical history (09/26/2022).     Social History  She reports that she quit smoking about 6 years ago. Her smoking use included cigarettes. She has a 50.00 pack-year smoking history. She uses smokeless tobacco. She reports that she does not currently use alcohol. She reports that she does not use drugs.    Family History  No family history on file.     Allergies  Acetaminophen, Penicillins, and Sulfa (sulfonamide antibiotics)    Medications  Current Outpatient Medications   Medication Sig Dispense Refill    BD Ultra-Fine Mini Pen Needle 31 gauge x 3/16\" needle USE AS DIRECTED ONCE DAILY      calcium carbonate-vitamin D3 600 mg-5 mcg (200 unit) tablet Take by mouth.      carbamide peroxide (Debrox) 6.5 % otic solution Administer 5 drops into each ear 2 times a day. 15 mL 3    cholecalciferol (Vitamin D-3) 5,000 Units tablet Take by mouth.      diclofenac sodium 1 % kit Place on the skin.      doxycycline (Vibramycin) 100 mg capsule Take 1 capsule (100 mg) by mouth 2 times a day.      esomeprazole (NexIUM) 40 mg DR capsule Take by mouth.      famotidine (Pepcid) 20 mg tablet Take 1 tablet (20 mg) by mouth once daily.      fluticasone (Flonase) 50 mcg/actuation nasal spray Administer 1 spray into each nostril once daily. 16 g 11    fluticasone propion-salmeteroL (Advair Diskus) 250-50 mcg/dose diskus inhaler Inhale 1 puff every 12 hours. 60 each 11    insulin detemir (Levemir Flextouch) 100 unit/mL (3 mL) pen Inject under the skin.      insulin glargine (Lantus Solostar U-100 Insulin) 100 unit/mL (3 mL) pen INJECT 30 UNITS SUBCUTANEOUSLY AT BEDTIME *REPLACING LEVEMIR* 3 mL 11    lidocaine HCL 4 % " liquid roll-on Aspercreme Lidocaine 4 % External Liquid APPLY 1 INCH 3 times daily PRN Quantity: 0 Refills: 0 Ordered: 9-Sep-2019 DO Active      lisinopril 10 mg tablet Take 1 tablet (10 mg) by mouth once every 24 hours. 30 tablet 11    metFORMIN (Glucophage) 1,000 mg tablet Take 1 tablet (1,000 mg) by mouth 2 times a day with meals. 60 tablet 11    nystatin-triamcinolone (Mycolog II) ointment Apply topically 3 times a day.      OneTouch Verio test strips strip Inject 1 strip under the skin 2 times a day. 100 strip 3    simvastatin (Zocor) 20 mg tablet Take 1 tablet (20 mg) by mouth once daily at bedtime. Lindsay floasiu 90 tablet 3    traMADol (Ultram) 50 mg tablet Take 1 tablet (50 mg) by mouth 3 times a day as needed.      Ventolin HFA 90 mcg/actuation inhaler        No current facility-administered medications for this visit.       Review of Systems    REVIEW OF SYSTEMS  GENERAL:  Negative for malaise, significant weight loss, fever  CARDIOVASCULAR: leg swelling   MUSCULOSKELETAL:  Negative for joint pain or swelling, back pain, and muscle pain.  SKIN:  Negative for lesions, rash, and itching  PSYCH:  Negative for sleep disturbance, mood disorder and recent psychosocial stressors  NEURO: Negative, denies any burning, tingling or numbness     Objective:   Vasc: DP and PT pulses are palpable bilateral.  CFT is less than 3 seconds bilateral.  Skin temperature is warm to cool proximal to distal bilateral.      Neuro:  Light touch is intact to the foot bilateral.       Derm: Nails 1-5 bilateral are thickened, elongated and crumbly with subungual debris. Skin is supple with normal texture and turgor noted.  Webspaces are clean, dry and intact bilateral.  There are no hyperkeratoses, ulcerations, verruca or other lesions noted.      Ortho: Muscle strength is 5/5 for all pedal groups tested.  Ankle joint, subtalar joint, 1st MPJ and lesser MPJ ROM is full and without pain or crepitus.  The foot type is rectus bilateral off  weight bearing.  There are no structural deformities noted.    Assessment/Plan     Diagnoses and all orders for this visit:  Chronic pain of toe of left foot  Chronic pain of toe of right foot  Nail fungus      Toenails are debrided in length and thickness to avoid infection and for pain relief  Coco Escamilla-Jeana, DPM  [address] [place] [address]             Albertina Sierra, ANALI

## 2023-11-08 DIAGNOSIS — E11.9 TYPE 2 DIABETES MELLITUS WITHOUT COMPLICATION, UNSPECIFIED WHETHER LONG TERM INSULIN USE (MULTI): Primary | ICD-10-CM

## 2023-11-09 RX ORDER — BLOOD-GLUCOSE METER
EACH MISCELLANEOUS
Qty: 100 STRIP | Refills: 10 | Status: SHIPPED | OUTPATIENT
Start: 2023-11-09

## 2023-12-18 DIAGNOSIS — Z00.00 HEALTHCARE MAINTENANCE: Primary | ICD-10-CM

## 2023-12-18 RX ORDER — ALBUTEROL SULFATE 90 UG/1
AEROSOL, METERED RESPIRATORY (INHALATION)
Qty: 6.7 G | Refills: 10 | Status: SHIPPED | OUTPATIENT
Start: 2023-12-18

## 2024-02-12 ENCOUNTER — PROCEDURE VISIT (OUTPATIENT)
Dept: PODIATRY | Facility: CLINIC | Age: 89
End: 2024-02-12
Payer: COMMERCIAL

## 2024-02-12 ENCOUNTER — LAB (OUTPATIENT)
Dept: LAB | Facility: LAB | Age: 89
End: 2024-02-12
Payer: COMMERCIAL

## 2024-02-12 ENCOUNTER — OFFICE VISIT (OUTPATIENT)
Dept: PRIMARY CARE | Facility: CLINIC | Age: 89
End: 2024-02-12
Payer: COMMERCIAL

## 2024-02-12 VITALS
TEMPERATURE: 97.3 F | SYSTOLIC BLOOD PRESSURE: 110 MMHG | OXYGEN SATURATION: 93 % | WEIGHT: 147.8 LBS | BODY MASS INDEX: 27.9 KG/M2 | HEIGHT: 61 IN | RESPIRATION RATE: 18 BRPM | DIASTOLIC BLOOD PRESSURE: 60 MMHG

## 2024-02-12 DIAGNOSIS — E11.9 TYPE 2 DIABETES MELLITUS WITHOUT RETINOPATHY (MULTI): ICD-10-CM

## 2024-02-12 DIAGNOSIS — E11.9 TYPE 2 DIABETES MELLITUS WITHOUT RETINOPATHY (MULTI): Primary | ICD-10-CM

## 2024-02-12 DIAGNOSIS — G89.29 CHRONIC PAIN OF TOE OF RIGHT FOOT: ICD-10-CM

## 2024-02-12 DIAGNOSIS — I15.8 OTHER SECONDARY HYPERTENSION: ICD-10-CM

## 2024-02-12 DIAGNOSIS — R35.0 FREQUENCY OF URINATION: Primary | ICD-10-CM

## 2024-02-12 DIAGNOSIS — E55.9 VITAMIN D DEFICIENCY: ICD-10-CM

## 2024-02-12 DIAGNOSIS — E11.22 TYPE 2 DIABETES MELLITUS WITH DIABETIC CHRONIC KIDNEY DISEASE, UNSPECIFIED CKD STAGE, UNSPECIFIED WHETHER LONG TERM INSULIN USE (MULTI): ICD-10-CM

## 2024-02-12 DIAGNOSIS — M79.674 CHRONIC PAIN OF TOE OF RIGHT FOOT: ICD-10-CM

## 2024-02-12 DIAGNOSIS — J41.0 SIMPLE CHRONIC BRONCHITIS (MULTI): ICD-10-CM

## 2024-02-12 DIAGNOSIS — B35.1 NAIL FUNGUS: ICD-10-CM

## 2024-02-12 LAB
25(OH)D3 SERPL-MCNC: 46 NG/ML (ref 30–100)
APPEARANCE UR: ABNORMAL
BACTERIA #/AREA URNS AUTO: ABNORMAL /HPF
BASOPHILS # BLD AUTO: 0.05 X10*3/UL (ref 0–0.1)
BASOPHILS NFR BLD AUTO: 0.5 %
BILIRUB UR STRIP.AUTO-MCNC: NEGATIVE MG/DL
CHOLEST SERPL-MCNC: 181 MG/DL (ref 0–199)
CHOLESTEROL/HDL RATIO: 3.1
COLOR UR: YELLOW
CREAT UR-MCNC: 58.9 MG/DL (ref 20–320)
EOSINOPHIL # BLD AUTO: 0.26 X10*3/UL (ref 0–0.4)
EOSINOPHIL NFR BLD AUTO: 2.5 %
ERYTHROCYTE [DISTWIDTH] IN BLOOD BY AUTOMATED COUNT: 12.5 % (ref 11.5–14.5)
GLUCOSE UR STRIP.AUTO-MCNC: NEGATIVE MG/DL
HCT VFR BLD AUTO: 42 % (ref 36–46)
HDLC SERPL-MCNC: 57.8 MG/DL
HGB BLD-MCNC: 13.4 G/DL (ref 12–16)
IMM GRANULOCYTES # BLD AUTO: 0.03 X10*3/UL (ref 0–0.5)
IMM GRANULOCYTES NFR BLD AUTO: 0.3 % (ref 0–0.9)
KETONES UR STRIP.AUTO-MCNC: NEGATIVE MG/DL
LDLC SERPL CALC-MCNC: 96 MG/DL
LEUKOCYTE ESTERASE UR QL STRIP.AUTO: ABNORMAL
LYMPHOCYTES # BLD AUTO: 2.55 X10*3/UL (ref 0.8–3)
LYMPHOCYTES NFR BLD AUTO: 25 %
MAGNESIUM SERPL-MCNC: 1.73 MG/DL (ref 1.6–2.4)
MCH RBC QN AUTO: 28.8 PG (ref 26–34)
MCHC RBC AUTO-ENTMCNC: 31.9 G/DL (ref 32–36)
MCV RBC AUTO: 90 FL (ref 80–100)
MICROALBUMIN UR-MCNC: 15 MG/L
MICROALBUMIN/CREAT UR: 25.5 UG/MG CREAT
MONOCYTES # BLD AUTO: 0.93 X10*3/UL (ref 0.05–0.8)
MONOCYTES NFR BLD AUTO: 9.1 %
MUCOUS THREADS #/AREA URNS AUTO: ABNORMAL /LPF
NEUTROPHILS # BLD AUTO: 6.38 X10*3/UL (ref 1.6–5.5)
NEUTROPHILS NFR BLD AUTO: 62.6 %
NITRITE UR QL STRIP.AUTO: NEGATIVE
NON HDL CHOLESTEROL: 123 MG/DL (ref 0–149)
NRBC BLD-RTO: 0 /100 WBCS (ref 0–0)
PH UR STRIP.AUTO: 7 [PH]
PLATELET # BLD AUTO: 315 X10*3/UL (ref 150–450)
POC HEMOGLOBIN A1C: 8.3 % (ref 4.2–6.5)
PROT UR STRIP.AUTO-MCNC: NEGATIVE MG/DL
RBC # BLD AUTO: 4.65 X10*6/UL (ref 4–5.2)
RBC # UR STRIP.AUTO: NEGATIVE /UL
RBC #/AREA URNS AUTO: ABNORMAL /HPF
SP GR UR STRIP.AUTO: 1.01
SQUAMOUS #/AREA URNS AUTO: ABNORMAL /HPF
TRIGL SERPL-MCNC: 134 MG/DL (ref 0–149)
TSH SERPL-ACNC: 0.54 MIU/L (ref 0.44–3.98)
UROBILINOGEN UR STRIP.AUTO-MCNC: <2 MG/DL
VIT B12 SERPL-MCNC: 330 PG/ML (ref 211–911)
VLDL: 27 MG/DL (ref 0–40)
WBC # BLD AUTO: 10.2 X10*3/UL (ref 4.4–11.3)
WBC #/AREA URNS AUTO: ABNORMAL /HPF

## 2024-02-12 PROCEDURE — 81001 URINALYSIS AUTO W/SCOPE: CPT

## 2024-02-12 PROCEDURE — 99214 OFFICE O/P EST MOD 30 MIN: CPT | Performed by: INTERNAL MEDICINE

## 2024-02-12 PROCEDURE — 83735 ASSAY OF MAGNESIUM: CPT

## 2024-02-12 PROCEDURE — 80061 LIPID PANEL: CPT

## 2024-02-12 PROCEDURE — 1160F RVW MEDS BY RX/DR IN RCRD: CPT | Performed by: INTERNAL MEDICINE

## 2024-02-12 PROCEDURE — 3074F SYST BP LT 130 MM HG: CPT | Performed by: INTERNAL MEDICINE

## 2024-02-12 PROCEDURE — 82607 VITAMIN B-12: CPT

## 2024-02-12 PROCEDURE — 84443 ASSAY THYROID STIM HORMONE: CPT

## 2024-02-12 PROCEDURE — 36415 COLL VENOUS BLD VENIPUNCTURE: CPT

## 2024-02-12 PROCEDURE — 11721 DEBRIDE NAIL 6 OR MORE: CPT | Performed by: PODIATRIST

## 2024-02-12 PROCEDURE — 86803 HEPATITIS C AB TEST: CPT

## 2024-02-12 PROCEDURE — 1159F MED LIST DOCD IN RCRD: CPT | Performed by: INTERNAL MEDICINE

## 2024-02-12 PROCEDURE — 3078F DIAST BP <80 MM HG: CPT | Performed by: INTERNAL MEDICINE

## 2024-02-12 PROCEDURE — 83036 HEMOGLOBIN GLYCOSYLATED A1C: CPT | Performed by: INTERNAL MEDICINE

## 2024-02-12 PROCEDURE — 85025 COMPLETE CBC W/AUTO DIFF WBC: CPT

## 2024-02-12 PROCEDURE — 82306 VITAMIN D 25 HYDROXY: CPT

## 2024-02-12 PROCEDURE — 82570 ASSAY OF URINE CREATININE: CPT

## 2024-02-12 PROCEDURE — 82043 UR ALBUMIN QUANTITATIVE: CPT

## 2024-02-12 NOTE — PROGRESS NOTES
"History Of Present Illness  Lindsay Hauser is a 88 y.o. female presenting for diabetic nail care. Patient complains with painful elongated nails.     Past Medical History  She has no past medical history on file.    Surgical History  She has a past surgical history that includes Other surgical history (09/09/2019) and Other surgical history (09/26/2022).     Social History  She reports that she quit smoking about 7 years ago. Her smoking use included cigarettes. She has a 50.00 pack-year smoking history. She uses smokeless tobacco. She reports that she does not currently use alcohol. She reports that she does not use drugs.    Family History  No family history on file.     Allergies  Acetaminophen, Penicillins, and Sulfa (sulfonamide antibiotics)    Medications  Current Outpatient Medications   Medication Sig Dispense Refill    albuterol 90 mcg/actuation inhaler INHALE ONE (1) PUFF BY MOUTH EVERY 6 HOURS AS NEEDED 6.7 g 10    BD Ultra-Fine Mini Pen Needle 31 gauge x 3/16\" needle USE AS DIRECTED ONCE DAILY      calcium carbonate-vitamin D3 600 mg-5 mcg (200 unit) tablet Take by mouth.      cholecalciferol (Vitamin D-3) 5,000 Units tablet Take by mouth.      diclofenac sodium 1 % kit Place on the skin.      doxycycline (Vibramycin) 100 mg capsule Take 1 capsule (100 mg) by mouth 2 times a day.      esomeprazole (NexIUM) 40 mg DR capsule Take by mouth.      famotidine (Pepcid) 20 mg tablet Take 1 tablet (20 mg) by mouth once daily.      fluticasone (Flonase) 50 mcg/actuation nasal spray Administer 1 spray into each nostril once daily. 16 g 11    fluticasone propion-salmeteroL (Advair Diskus) 250-50 mcg/dose diskus inhaler Inhale 1 puff every 12 hours. 60 each 11    insulin detemir (Levemir Flextouch) 100 unit/mL (3 mL) pen Inject under the skin.      insulin glargine (Lantus Solostar U-100 Insulin) 100 unit/mL (3 mL) pen INJECT 30 UNITS SUBCUTANEOUSLY AT BEDTIME *REPLACING LEVEMIR* 3 mL 11    lidocaine HCL 4 % liquid " roll-on Aspercreme Lidocaine 4 % External Liquid APPLY 1 INCH 3 times daily PRN Quantity: 0 Refills: 0 Ordered: 9-Sep-2019 DO Active      lisinopril 10 mg tablet Take 1 tablet (10 mg) by mouth once every 24 hours. 30 tablet 11    metFORMIN (Glucophage) 1,000 mg tablet Take 1 tablet (1,000 mg) by mouth 2 times a day with meals. 60 tablet 11    nystatin-triamcinolone (Mycolog II) ointment Apply topically 3 times a day.      OneTouch Verio test strips strip USE TO TEST BLOOD SUGAR TWICE DAILY 100 strip 10    simvastatin (Zocor) 20 mg tablet Take 1 tablet (20 mg) by mouth once daily at bedtime. Lindsay floasiu 90 tablet 3    traMADol (Ultram) 50 mg tablet Take 1 tablet (50 mg) by mouth 3 times a day as needed.       No current facility-administered medications for this visit.       Review of Systems    REVIEW OF SYSTEMS  GENERAL:  Negative for malaise, significant weight loss, fever  CARDIOVASCULAR: leg swelling   MUSCULOSKELETAL:  Negative for joint pain or swelling, back pain, and muscle pain.  SKIN:  Negative for lesions, rash, and itching  PSYCH:  Negative for sleep disturbance, mood disorder and recent psychosocial stressors  NEURO: Negative, denies any burning, tingling or numbness     Objective:   Vasc: DP and PT pulses are palpable bilateral.  CFT is less than 3 seconds bilateral.  Skin temperature is warm to cool proximal to distal bilateral.      Neuro:  Light touch is intact to the foot bilateral.       Derm: Nails 1-5 bilateral are thickened, elongated and crumbly with subungual debris. Ingrown first bilateral. Skin is supple with normal texture and turgor noted.  Webspaces are clean, dry and intact bilateral.  There are no hyperkeratoses, ulcerations, verruca or other lesions noted.      Ortho: Muscle strength is 5/5 for all pedal groups tested.  Ankle joint, subtalar joint, 1st MPJ and lesser MPJ ROM is full and without pain or crepitus.  The foot type is rectus bilateral off weight bearing.  There are no  structural deformities noted.    Assessment/Plan     DM  Painful nail mycosis        Toenails are debrided in length and thickness to avoid infection and for pain relief  Coco Meija, GABRIELLA  [address] [place] [address]             Albertina Sierra, CMA

## 2024-02-12 NOTE — PATIENT INSTRUCTIONS
Was nice seeing you today.  Continue same medication.  Have lab work done before next appointment if labs were ordered today.  Fu in 3 month.  Call/ contact our office with any concerns.  Increase lantus to 34 ui  If you have labs or test done and you can't see the report in your chart or you didn't here from us in 2 weeks after test/labs done , please, call our office for reports.  Please , do not assume that they were normal.

## 2024-02-12 NOTE — PROGRESS NOTES
"Subjective   Lindsay Hauser is a 88 y.o. female who presents for UTI (UTI x 2 weeks symptoms ).  Labs went today to have it done, reports not available yet    Possible UTI- the last  2 weeks,  pain while urinating , red color , frequency , burning pain, could not urinate just 1 drop and on the pad did see the red color and later pink ,  took AZO UTI   and pain relief OTC  AZO max strength  to help with pain, the pain is better ,no more red color ,got better   Daughter  Halley in the room with patient today   DM-last HGBA1C was 8.5 on 10.30.23 , today is 8.3  Checking BG at home BID   This AM - BG was 110  Yesterday had amandeep  at home from insurance and BP was good but oxygen level was low, advised her to put on the oxygen 2 L ,usually  Q 6 months up to 1 year       Review of Systems   All other systems reviewed and are negative.      Objective   Physical Exam  Constitutional:       Appearance: Normal appearance.   HENT:      Head: Normocephalic and atraumatic.   Eyes:      Pupils: Pupils are equal, round, and reactive to light.   Cardiovascular:      Rate and Rhythm: Normal rate and regular rhythm.   Pulmonary:      Effort: Pulmonary effort is normal.      Breath sounds: Normal breath sounds.   Musculoskeletal:         General: Normal range of motion.      Cervical back: Normal range of motion and neck supple.   Skin:     General: Skin is warm.   Neurological:      General: No focal deficit present.      Mental Status: She is alert and oriented to person, place, and time.   Psychiatric:         Mood and Affect: Mood normal.         Behavior: Behavior normal.       /60 (BP Location: Left arm, Patient Position: Sitting)   Temp 36.3 °C (97.3 °F)   Resp 18   Ht 1.549 m (5' 1\")   Wt 67 kg (147 lb 12.8 oz)   SpO2 93% Comment: RA  BMI 27.93 kg/m²       Assessment/Plan   Problem List Items Addressed This Visit       COPD (chronic obstructive pulmonary disease) (CMS/[facility])    Hypertension    Type 2 diabetes mellitus " without retinopathy (CMS/[facility])     Hba1c 8.3  On lantus 32 ui,  will increase to 34 ui         Relevant Orders    POCT glycosylated hemoglobin (Hb A1C) manually resulted (Completed)    Frequency of urination - Primary    Relevant Orders    Urine Culture    Type 2 diabetes mellitus with diabetic chronic kidney disease, unspecified CKD stage, unspecified whether long term insulin use (CMS/[facility])

## 2024-02-13 LAB — HCV AB SER QL: NONREACTIVE

## 2024-02-28 DIAGNOSIS — E11.9 TYPE 2 DIABETES MELLITUS WITHOUT RETINOPATHY (MULTI): Primary | ICD-10-CM

## 2024-02-29 RX ORDER — INSULIN GLARGINE 100 [IU]/ML
INJECTION, SOLUTION SUBCUTANEOUS
Qty: 15 ML | Refills: 10 | Status: SHIPPED | OUTPATIENT
Start: 2024-02-29

## 2024-03-11 ENCOUNTER — APPOINTMENT (OUTPATIENT)
Dept: PRIMARY CARE | Facility: CLINIC | Age: 89
End: 2024-03-11
Payer: COMMERCIAL

## 2024-04-22 ENCOUNTER — APPOINTMENT (OUTPATIENT)
Dept: PODIATRY | Facility: CLINIC | Age: 89
End: 2024-04-22
Payer: COMMERCIAL

## 2024-04-29 ENCOUNTER — PROCEDURE VISIT (OUTPATIENT)
Dept: PODIATRY | Facility: CLINIC | Age: 89
End: 2024-04-29
Payer: COMMERCIAL

## 2024-04-29 DIAGNOSIS — M79.675 CHRONIC PAIN OF TOE OF LEFT FOOT: ICD-10-CM

## 2024-04-29 DIAGNOSIS — M79.674 CHRONIC PAIN OF TOE OF RIGHT FOOT: ICD-10-CM

## 2024-04-29 DIAGNOSIS — G89.29 CHRONIC PAIN OF TOE OF LEFT FOOT: ICD-10-CM

## 2024-04-29 DIAGNOSIS — E11.9 TYPE 2 DIABETES MELLITUS WITHOUT COMPLICATION, WITHOUT LONG-TERM CURRENT USE OF INSULIN (MULTI): Primary | ICD-10-CM

## 2024-04-29 DIAGNOSIS — G89.29 CHRONIC PAIN OF TOE OF RIGHT FOOT: ICD-10-CM

## 2024-04-29 DIAGNOSIS — B35.1 NAIL FUNGUS: ICD-10-CM

## 2024-04-29 DIAGNOSIS — L60.0 INGROWN TOENAIL: ICD-10-CM

## 2024-04-29 PROCEDURE — 99212 OFFICE O/P EST SF 10 MIN: CPT | Performed by: PODIATRIST

## 2024-04-29 NOTE — PROGRESS NOTES
"History Of Present Illness  Lindsay Hauser is a 89 y.o. female presenting for diabetic nail care. Patient complains with painful elongated nails.     Past Medical History  She has no past medical history on file.    Surgical History  She has a past surgical history that includes Other surgical history (09/09/2019) and Other surgical history (09/26/2022).     Social History  She reports that she quit smoking about 7 years ago. Her smoking use included cigarettes. She started smoking about 57 years ago. She has a 50 pack-year smoking history. She uses smokeless tobacco. She reports that she does not currently use alcohol. She reports that she does not use drugs.    Family History  No family history on file.     Allergies  Acetaminophen, Penicillins, and Sulfa (sulfonamide antibiotics)    Medications  Current Outpatient Medications   Medication Sig Dispense Refill    albuterol 90 mcg/actuation inhaler INHALE ONE (1) PUFF BY MOUTH EVERY 6 HOURS AS NEEDED 6.7 g 10    BD Ultra-Fine Mini Pen Needle 31 gauge x 3/16\" needle USE AS DIRECTED ONCE DAILY      calcium carbonate-vitamin D3 600 mg-5 mcg (200 unit) tablet Take by mouth.      cholecalciferol (Vitamin D-3) 5,000 Units tablet Take by mouth.      diclofenac sodium 1 % kit Place on the skin.      esomeprazole (NexIUM) 40 mg DR capsule Take by mouth.      famotidine (Pepcid) 20 mg tablet Take 1 tablet (20 mg) by mouth once daily.      fluticasone (Flonase) 50 mcg/actuation nasal spray Administer 1 spray into each nostril once daily. 16 g 11    fluticasone propion-salmeteroL (Advair Diskus) 250-50 mcg/dose diskus inhaler Inhale 1 puff every 12 hours. 60 each 11    insulin glargine (Lantus Solostar U-100 Insulin) 100 unit/mL (3 mL) pen INJECT UP TO 30 UNITS SUBCUTANEOUSLY AT BEDTIME *REPLACING LEVEMIR* 15 mL 10    lisinopril 10 mg tablet Take 1 tablet (10 mg) by mouth once every 24 hours. 30 tablet 11    metFORMIN (Glucophage) 1,000 mg tablet Take 1 tablet (1,000 mg) by mouth " 2 times a day with meals. 60 tablet 11    nystatin-triamcinolone (Mycolog II) ointment Apply topically 3 times a day.      OneTouch Verio test strips strip USE TO TEST BLOOD SUGAR TWICE DAILY 100 strip 10    simvastatin (Zocor) 20 mg tablet Take 1 tablet (20 mg) by mouth once daily at bedtime. Lindsay floasiu 90 tablet 3     No current facility-administered medications for this visit.       Review of Systems    REVIEW OF SYSTEMS  GENERAL:  Negative for malaise, significant weight loss, fever  CARDIOVASCULAR: leg swelling   MUSCULOSKELETAL:  Negative for joint pain or swelling, back pain, and muscle pain.  SKIN:  Negative for lesions, rash, and itching  PSYCH:  Negative for sleep disturbance, mood disorder and recent psychosocial stressors  NEURO: Negative, denies any burning, tingling or numbness     Objective:   Vasc: DP and PT pulses are palpable bilateral.  CFT is less than 3 seconds bilateral.  Skin temperature is warm to cool proximal to distal bilateral.      Neuro:  Light touch is intact to the foot bilateral.       Derm: Nails 1-5 bilateral are thickened, elongated and crumbly with subungual debris. Ingrown first bilateral. Skin is supple with normal texture and turgor noted.  Webspaces are clean, dry and intact bilateral.  First toenails bilateral are ingrown and incurvated and very tender to touch  Ortho: Muscle strength is 5/5 for all pedal groups tested.  Ankle joint, subtalar joint, 1st MPJ and lesser MPJ ROM is full and without pain or crepitus.  The foot type is rectus bilateral off weight bearing.  There are no structural deformities noted.    Assessment/Plan     DM  Painful nail mycosis  Painful ingrown toenails      Toenails are debrided in length and thickness to avoid infection and for pain relief    Excised nail borders.  We discussed the option of permanent toenail avulsion secondary to patient's chronic pain.  Patient will consider and follow-up with me as needed.    Coco Escamilla-Jeana,  DPM  [address] [place] [address]             Albertina Sierra, ANALI

## 2024-05-20 ENCOUNTER — APPOINTMENT (OUTPATIENT)
Dept: PRIMARY CARE | Facility: CLINIC | Age: 89
End: 2024-05-20
Payer: COMMERCIAL

## 2024-06-03 ENCOUNTER — OFFICE VISIT (OUTPATIENT)
Dept: PRIMARY CARE | Facility: CLINIC | Age: 89
End: 2024-06-03
Payer: COMMERCIAL

## 2024-06-03 VITALS
HEIGHT: 61 IN | WEIGHT: 144 LBS | TEMPERATURE: 96.4 F | OXYGEN SATURATION: 90 % | HEART RATE: 87 BPM | RESPIRATION RATE: 16 BRPM | DIASTOLIC BLOOD PRESSURE: 54 MMHG | BODY MASS INDEX: 27.19 KG/M2 | SYSTOLIC BLOOD PRESSURE: 110 MMHG

## 2024-06-03 DIAGNOSIS — J41.0 SIMPLE CHRONIC BRONCHITIS (MULTI): ICD-10-CM

## 2024-06-03 DIAGNOSIS — I10 PRIMARY HYPERTENSION: Primary | ICD-10-CM

## 2024-06-03 DIAGNOSIS — M81.0 AGE-RELATED OSTEOPOROSIS WITHOUT CURRENT PATHOLOGICAL FRACTURE: ICD-10-CM

## 2024-06-03 DIAGNOSIS — E78.5 DYSLIPIDEMIA: ICD-10-CM

## 2024-06-03 DIAGNOSIS — R25.2 LEG CRAMPS: ICD-10-CM

## 2024-06-03 DIAGNOSIS — E11.22 TYPE 2 DIABETES MELLITUS WITH DIABETIC CHRONIC KIDNEY DISEASE, UNSPECIFIED CKD STAGE, UNSPECIFIED WHETHER LONG TERM INSULIN USE (MULTI): ICD-10-CM

## 2024-06-03 DIAGNOSIS — E11.00 TYPE 2 DIABETES MELLITUS WITH HYPEROSMOLARITY WITHOUT COMA, WITHOUT LONG-TERM CURRENT USE OF INSULIN (MULTI): ICD-10-CM

## 2024-06-03 DIAGNOSIS — M85.80 OSTEOPENIA, UNSPECIFIED LOCATION: ICD-10-CM

## 2024-06-03 DIAGNOSIS — Z00.00 MEDICARE ANNUAL WELLNESS VISIT, SUBSEQUENT: ICD-10-CM

## 2024-06-03 DIAGNOSIS — Z13.9 SCREENING DUE: ICD-10-CM

## 2024-06-03 LAB — POC HEMOGLOBIN A1C: 7.5 % (ref 4.2–6.5)

## 2024-06-03 PROCEDURE — 1036F TOBACCO NON-USER: CPT | Performed by: INTERNAL MEDICINE

## 2024-06-03 PROCEDURE — 3078F DIAST BP <80 MM HG: CPT | Performed by: INTERNAL MEDICINE

## 2024-06-03 PROCEDURE — 1170F FXNL STATUS ASSESSED: CPT | Performed by: INTERNAL MEDICINE

## 2024-06-03 PROCEDURE — 3074F SYST BP LT 130 MM HG: CPT | Performed by: INTERNAL MEDICINE

## 2024-06-03 PROCEDURE — 1160F RVW MEDS BY RX/DR IN RCRD: CPT | Performed by: INTERNAL MEDICINE

## 2024-06-03 PROCEDURE — 83036 HEMOGLOBIN GLYCOSYLATED A1C: CPT | Performed by: INTERNAL MEDICINE

## 2024-06-03 PROCEDURE — 1158F ADVNC CARE PLAN TLK DOCD: CPT | Performed by: INTERNAL MEDICINE

## 2024-06-03 PROCEDURE — 99497 ADVNCD CARE PLAN 30 MIN: CPT | Performed by: INTERNAL MEDICINE

## 2024-06-03 PROCEDURE — G0439 PPPS, SUBSEQ VISIT: HCPCS | Performed by: INTERNAL MEDICINE

## 2024-06-03 PROCEDURE — 1159F MED LIST DOCD IN RCRD: CPT | Performed by: INTERNAL MEDICINE

## 2024-06-03 PROCEDURE — 1123F ACP DISCUSS/DSCN MKR DOCD: CPT | Performed by: INTERNAL MEDICINE

## 2024-06-03 RX ORDER — MINERAL OIL
180 ENEMA (ML) RECTAL DAILY
Qty: 30 TABLET | Refills: 5 | Status: SHIPPED | OUTPATIENT
Start: 2024-06-03 | End: 2025-06-03

## 2024-06-03 RX ORDER — FLUTICASONE PROPIONATE 50 MCG
1 SPRAY, SUSPENSION (ML) NASAL
Qty: 9.9 ML | Refills: 11 | Status: SHIPPED | OUTPATIENT
Start: 2024-06-03

## 2024-06-03 RX ORDER — CALCIUM CARBONATE/VITAMIN D3 600MG-5MCG
2 TABLET ORAL DAILY
Qty: 60 TABLET | Refills: 11 | Status: SHIPPED | OUTPATIENT
Start: 2024-06-03

## 2024-06-03 RX ORDER — FLUTICASONE PROPIONATE 50 MCG
1 SPRAY, SUSPENSION (ML) NASAL
Qty: 16 G | Refills: 11 | Status: SHIPPED | OUTPATIENT
Start: 2024-06-03

## 2024-06-03 RX ORDER — GUAIFENESIN 600 MG/1
1200 TABLET, EXTENDED RELEASE ORAL 2 TIMES DAILY
Qty: 60 TABLET | Refills: 3 | Status: SHIPPED | OUTPATIENT
Start: 2024-06-03 | End: 2025-06-03

## 2024-06-03 RX ORDER — NYSTATIN AND TRIAMCINOLONE ACETONIDE 100000; 1 [USP'U]/G; MG/G
OINTMENT TOPICAL 3 TIMES DAILY
Qty: 60 G | Refills: 3 | Status: SHIPPED | OUTPATIENT
Start: 2024-06-03

## 2024-06-03 ASSESSMENT — ENCOUNTER SYMPTOMS
SHORTNESS OF BREATH: 1
DYSURIA: 0
COUGH: 1
ADENOPATHY: 0
WEAKNESS: 0
ARTHRALGIAS: 0
DIZZINESS: 0
PALPITATIONS: 0
SLEEP DISTURBANCE: 0
CONFUSION: 0
ABDOMINAL PAIN: 0
WHEEZING: 0
UNEXPECTED WEIGHT CHANGE: 0
DIARRHEA: 0
NAUSEA: 0
JOINT SWELLING: 0
CHEST TIGHTNESS: 0
FATIGUE: 0
CONSTIPATION: 0
VOMITING: 0
CHILLS: 0
SORE THROAT: 0

## 2024-06-03 ASSESSMENT — PATIENT HEALTH QUESTIONNAIRE - PHQ9
10. IF YOU CHECKED OFF ANY PROBLEMS, HOW DIFFICULT HAVE THESE PROBLEMS MADE IT FOR YOU TO DO YOUR WORK, TAKE CARE OF THINGS AT HOME, OR GET ALONG WITH OTHER PEOPLE: SOMEWHAT DIFFICULT
1. LITTLE INTEREST OR PLEASURE IN DOING THINGS: NOT AT ALL
SUM OF ALL RESPONSES TO PHQ9 QUESTIONS 1 AND 2: 1
2. FEELING DOWN, DEPRESSED OR HOPELESS: SEVERAL DAYS

## 2024-06-03 ASSESSMENT — ACTIVITIES OF DAILY LIVING (ADL)
MANAGING_FINANCES: INDEPENDENT
TAKING_MEDICATION: INDEPENDENT
GROCERY_SHOPPING: INDEPENDENT
BATHING: INDEPENDENT
DRESSING: INDEPENDENT
DOING_HOUSEWORK: INDEPENDENT

## 2024-06-03 NOTE — PROGRESS NOTES
Subjective   Reason for Visit: Lindsay Hauser is an 89 y.o. female here for a Medicare Wellness visit.   Past Medical, Surgical, and Family History reviewed and updated in chart.  Reviewed all medications by prescribing practitioner or clinical pharmacist (such as prescriptions, OTCs, herbal therapies and supplements) and documented in the medical record.  Patient is here for her AWV.  Last A1C was 8.3% on 02.12.24, 7.6 today.  Dexa 03.08.22  Labs 02.14.24  Smoking status: Former (Quit: 2017)  Patient stated last week 05.25.24 went to ER Persistent cough for a couple of month, got ATB for 5 days and steroids, had Xray done 05.25.24. Still has runny nose.   Hospital records, labs and test reviewed, discharge instruction and medication discussed with pt.  === 03/08/22 ===    - Impression -  Hyperinflated lungs without consolidation or edema    Discussed living will and power of  for health care. All question answered and information given to patient.  Will bring in a copy of the Will for health care next visit and will call with any question.  Time spent around 16 minutes or more.    \      Patient Care Team:  Nilson Berumen MD as PCP - General  Nilson Berumen MD as PCP - [facility] PCP     Review of Systems   Constitutional:  Negative for chills, fatigue and unexpected weight change.        Comment   HENT:  Positive for postnasal drip. Negative for congestion, ear pain and sore throat.    Respiratory:  Positive for cough and shortness of breath. Negative for chest tightness and wheezing.    Cardiovascular:  Negative for palpitations and leg swelling.   Gastrointestinal:  Negative for abdominal pain, constipation, diarrhea, nausea and vomiting.   Genitourinary:  Negative for dysuria and urgency.   Musculoskeletal:  Negative for arthralgias and joint swelling.   Skin:  Negative for rash.   Neurological:  Negative for dizziness and weakness.   Hematological:  Negative for adenopathy.  "  Psychiatric/Behavioral:  Negative for confusion and sleep disturbance.    All other systems reviewed and are negative.      Objective   Vitals:  /54 (BP Location: Left arm, Patient Position: Sitting)   Pulse 87   Temp 35.8 °C (96.4 °F)   Resp 16   Ht 1.549 m (5' 1\")   Wt 65.3 kg (144 lb)   SpO2 90%   BMI 27.21 kg/m²       Physical Exam  Constitutional:       Appearance: Normal appearance.   HENT:      Head: Normocephalic and atraumatic.   Eyes:      Pupils: Pupils are equal, round, and reactive to light.   Cardiovascular:      Rate and Rhythm: Normal rate and regular rhythm.   Pulmonary:      Effort: Pulmonary effort is normal.      Breath sounds: Normal breath sounds.   Musculoskeletal:         General: Normal range of motion.      Cervical back: Normal range of motion and neck supple.   Skin:     General: Skin is warm.   Neurological:      General: No focal deficit present.      Mental Status: She is alert and oriented to person, place, and time.   Psychiatric:         Mood and Affect: Mood normal.         Behavior: Behavior normal.       Lab Results   Component Value Date    CHOL 181 02/12/2024    CHOL 160 12/30/2022    CHOL 149 08/13/2021     Lab Results   Component Value Date    HDL 57.8 02/12/2024    HDL 57.7 12/30/2022    HDL 50.0 08/13/2021     Lab Results   Component Value Date    LDLCALC 96 02/12/2024     Lab Results   Component Value Date    TRIG 134 02/12/2024    TRIG 102 12/30/2022    TRIG 106 08/13/2021     No components found for: \"CHOLHDL\"  Lab Results   Component Value Date    TSH 0.54 02/12/2024     Lab Results   Component Value Date    WBC 10.2 02/12/2024    HGB 13.4 02/12/2024    HCT 42.0 02/12/2024    MCV 90 02/12/2024     02/12/2024         Assessment/Plan   Problem List Items Addressed This Visit       COPD (chronic obstructive pulmonary disease) (Multi)    Overview     COPD (chronic obstructive pulmonary disease);         Current Assessment & Plan     Seen in er for " cough, on  steroids, antb now         Dyslipidemia    Overview     ldl 110, hdl 48;         Hypertension - Primary    Type 2 diabetes mellitus (Multi)    Overview     Comment on above: hba1c 7.3, 7.5, 7.6, 6.9, 7.3, 8.3, 8;  hba1c 7.3, 7.5, 7.6, 6.9, 7.3, 8.3, 8,7;  hba1c 7.3, 7.5, 7.6, 6.9, 7.3, 8.3, 8,7, 7.8;         Relevant Orders    POCT glycosylated hemoglobin (Hb A1C) manually resulted (Completed)    Medicare annual wellness visit, subsequent    Type 2 diabetes mellitus with diabetic chronic kidney disease, unspecified CKD stage, unspecified whether long term insulin use (Multi)    Current Assessment & Plan     Well controlled, hba1c 7.6          Other Visit Diagnoses       Screening due

## 2024-06-24 ENCOUNTER — HOSPITAL ENCOUNTER (OUTPATIENT)
Dept: RADIOLOGY | Facility: CLINIC | Age: 89
Discharge: HOME | End: 2024-06-24
Payer: COMMERCIAL

## 2024-06-24 DIAGNOSIS — M81.0 AGE-RELATED OSTEOPOROSIS WITHOUT CURRENT PATHOLOGICAL FRACTURE: ICD-10-CM

## 2024-06-24 DIAGNOSIS — Z13.9 SCREENING DUE: ICD-10-CM

## 2024-06-29 ENCOUNTER — HOSPITAL ENCOUNTER (OUTPATIENT)
Dept: RADIOLOGY | Facility: CLINIC | Age: 89
Discharge: HOME | End: 2024-06-29
Payer: COMMERCIAL

## 2024-06-29 PROCEDURE — 77080 DXA BONE DENSITY AXIAL: CPT | Performed by: RADIOLOGY

## 2024-06-29 PROCEDURE — 77080 DXA BONE DENSITY AXIAL: CPT

## 2024-07-08 ENCOUNTER — APPOINTMENT (OUTPATIENT)
Dept: PODIATRY | Facility: CLINIC | Age: 89
End: 2024-07-08
Payer: COMMERCIAL

## 2024-07-08 DIAGNOSIS — B35.1 NAIL FUNGUS: ICD-10-CM

## 2024-07-08 DIAGNOSIS — E11.9 TYPE 2 DIABETES MELLITUS WITHOUT RETINOPATHY (MULTI): Primary | ICD-10-CM

## 2024-07-08 DIAGNOSIS — M79.674 PAIN IN TOES OF BOTH FEET: ICD-10-CM

## 2024-07-08 DIAGNOSIS — L60.0 INGROWN TOENAIL: ICD-10-CM

## 2024-07-08 DIAGNOSIS — M79.675 PAIN IN TOES OF BOTH FEET: ICD-10-CM

## 2024-07-08 PROCEDURE — 99213 OFFICE O/P EST LOW 20 MIN: CPT | Performed by: PODIATRIST

## 2024-07-08 RX ORDER — DOXYCYCLINE 100 MG/1
100 CAPSULE ORAL 2 TIMES DAILY
Qty: 10 CAPSULE | Refills: 0 | Status: SHIPPED | OUTPATIENT
Start: 2024-07-08 | End: 2024-07-13

## 2024-07-08 NOTE — PROGRESS NOTES
"History Of Present Illness  Lindsay Hauser is a 89 y.o. female presenting for diabetic nail care. Patient complains with painful elongated nails.  PCP Dr Berumen  Last visit 6/3/24     Past Medical History  She has no past medical history on file.    Surgical History  She has a past surgical history that includes Other surgical history (09/09/2019) and Other surgical history (09/26/2022).     Social History  She reports that she quit smoking about 7 years ago. Her smoking use included cigarettes. She started smoking about 57 years ago. She has a 50 pack-year smoking history. She has never used smokeless tobacco. She reports that she does not currently use alcohol. She reports that she does not use drugs.    Family History  No family history on file.     Allergies  Acetaminophen, Penicillins, and Sulfa (sulfonamide antibiotics)    Medications  Current Outpatient Medications   Medication Sig Dispense Refill    albuterol 90 mcg/actuation inhaler INHALE ONE (1) PUFF BY MOUTH EVERY 6 HOURS AS NEEDED 6.7 g 10    BD Ultra-Fine Mini Pen Needle 31 gauge x 3/16\" needle USE AS DIRECTED ONCE DAILY      calcium carbonate-vitamin D3 600 mg-5 mcg (200 unit) tablet Take 2 tablets by mouth once daily. 60 tablet 11    cholecalciferol (Vitamin D-3) 5,000 Units tablet Take by mouth.      diclofenac sodium 1 % kit Place 4 inches on the skin 2 times a day. 1 each 3    esomeprazole (NexIUM) 40 mg DR capsule Take by mouth.      famotidine (Pepcid) 20 mg tablet Take 1 tablet (20 mg) by mouth once daily.      fexofenadine (Allegra) 180 mg tablet Take 1 tablet (180 mg) by mouth once daily. 30 tablet 5    fluticasone (Flonase) 50 mcg/actuation nasal spray Administer 1 spray into each nostril once daily. 9.9 mL 11    fluticasone (Flonase) 50 mcg/actuation nasal spray Administer 1 spray into each nostril once daily. 16 g 11    fluticasone propion-salmeteroL (Advair Diskus) 250-50 mcg/dose diskus inhaler Inhale 1 puff every 12 hours. 60 each 11    " guaiFENesin (Mucinex) 600 mg 12 hr tablet Take 2 tablets (1,200 mg) by mouth 2 times a day. Do not crush, chew, or split. 60 tablet 3    insulin glargine (Lantus Solostar U-100 Insulin) 100 unit/mL (3 mL) pen INJECT UP TO 30 UNITS SUBCUTANEOUSLY AT BEDTIME *REPLACING LEVEMIR* 15 mL 10    lisinopril 10 mg tablet Take 1 tablet (10 mg) by mouth once every 24 hours. 30 tablet 11    metFORMIN (Glucophage) 1,000 mg tablet Take 1 tablet (1,000 mg) by mouth 2 times a day with meals. 60 tablet 11    nystatin-triamcinolone (Mycolog II) ointment Apply topically 3 times a day. 60 g 3    OneTouch Verio test strips strip USE TO TEST BLOOD SUGAR TWICE DAILY 100 strip 10    simvastatin (Zocor) 20 mg tablet Take 1 tablet (20 mg) by mouth once daily at bedtime. Lindsay floasiu 90 tablet 3     No current facility-administered medications for this visit.       Review of Systems    REVIEW OF SYSTEMS  GENERAL:  Negative for malaise, significant weight loss, fever  CARDIOVASCULAR: leg swelling   MUSCULOSKELETAL:  Negative for joint pain or swelling, back pain, and muscle pain.  SKIN:  Negative for lesions, rash, and itching  PSYCH:  Negative for sleep disturbance, mood disorder and recent psychosocial stressors  NEURO: Negative, denies any burning, tingling or numbness     Objective:   Vasc: DP and PT pulses are palpable bilateral.  CFT is less than 3 seconds bilateral.  Skin temperature is warm to cool proximal to distal bilateral.      Neuro:  Light touch is intact to the foot bilateral.       Derm: Nails 1-5 bilateral are thickened, elongated and crumbly with subungual debris. Ingrown first bilateral. Skin is supple with normal texture and turgor noted.  Webspaces are clean, dry and intact bilateral.  First toenail bilateral borders of right are ingrown and incurvated and very tender to touch  Ortho: Muscle strength is 5/5 for all pedal groups tested.  Ankle joint, subtalar joint, 1st MPJ and lesser MPJ ROM is full and without pain or  crepitus.  The foot type is rectus bilateral off weight bearing.  There are no structural deformities noted.    Assessment/Plan     DM  Painful nail mycosis  Painful ingrown toenails right first       Toenails are debrided in length and thickness to avoid infection and for pain relief    Excised nail borders.  We discussed the option of permanent toenail avulsion secondary to patient's chronic pain.  Pt will schedule, po ab called in  Coco Escamilla-Jeana, DPM  [id number] [id number]             Albertina Sierra CMA

## 2024-07-09 DIAGNOSIS — I10 PRIMARY HYPERTENSION: ICD-10-CM

## 2024-07-09 DIAGNOSIS — J41.0 SIMPLE CHRONIC BRONCHITIS (MULTI): ICD-10-CM

## 2024-07-09 DIAGNOSIS — E11.9 TYPE 2 DIABETES MELLITUS WITHOUT RETINOPATHY (MULTI): ICD-10-CM

## 2024-07-09 RX ORDER — FLUTICASONE PROPIONATE AND SALMETEROL 250; 50 UG/1; UG/1
1 POWDER RESPIRATORY (INHALATION) EVERY 12 HOURS
Qty: 60 EACH | Refills: 2 | Status: SHIPPED | OUTPATIENT
Start: 2024-07-09

## 2024-07-09 RX ORDER — METFORMIN HYDROCHLORIDE 1000 MG/1
1000 TABLET ORAL
Qty: 60 TABLET | Refills: 10 | Status: SHIPPED | OUTPATIENT
Start: 2024-07-09

## 2024-07-09 RX ORDER — LISINOPRIL 10 MG/1
TABLET ORAL
Qty: 30 TABLET | Refills: 10 | Status: SHIPPED | OUTPATIENT
Start: 2024-07-09

## 2024-08-13 DIAGNOSIS — E78.5 DYSLIPIDEMIA: Primary | ICD-10-CM

## 2024-08-13 RX ORDER — SIMVASTATIN 20 MG/1
20 TABLET, FILM COATED ORAL NIGHTLY
Qty: 30 TABLET | Refills: 10 | Status: SHIPPED | OUTPATIENT
Start: 2024-08-13

## 2024-09-05 DIAGNOSIS — J41.0 SIMPLE CHRONIC BRONCHITIS (MULTI): ICD-10-CM

## 2024-09-09 ENCOUNTER — APPOINTMENT (OUTPATIENT)
Dept: PODIATRY | Facility: CLINIC | Age: 89
End: 2024-09-09
Payer: COMMERCIAL

## 2024-09-09 DIAGNOSIS — G89.29 CHRONIC PAIN OF TOE OF LEFT FOOT: ICD-10-CM

## 2024-09-09 DIAGNOSIS — L60.0 INGROWN TOENAIL: ICD-10-CM

## 2024-09-09 DIAGNOSIS — B35.1 NAIL FUNGUS: ICD-10-CM

## 2024-09-09 DIAGNOSIS — E11.9 TYPE 2 DIABETES MELLITUS WITHOUT RETINOPATHY (MULTI): Primary | ICD-10-CM

## 2024-09-09 DIAGNOSIS — M79.674 CHRONIC PAIN OF TOE OF RIGHT FOOT: ICD-10-CM

## 2024-09-09 DIAGNOSIS — M79.675 CHRONIC PAIN OF TOE OF LEFT FOOT: ICD-10-CM

## 2024-09-09 DIAGNOSIS — G89.29 CHRONIC PAIN OF TOE OF RIGHT FOOT: ICD-10-CM

## 2024-09-09 PROCEDURE — 11750 EXCISION NAIL&NAIL MATRIX: CPT | Performed by: PODIATRIST

## 2024-09-09 PROCEDURE — 11721 DEBRIDE NAIL 6 OR MORE: CPT | Performed by: PODIATRIST

## 2024-09-09 RX ORDER — NYSTATIN AND TRIAMCINOLONE ACETONIDE 100000; 1 [USP'U]/G; MG/G
OINTMENT TOPICAL 3 TIMES DAILY
Qty: 60 G | Refills: 10 | Status: SHIPPED | OUTPATIENT
Start: 2024-09-09

## 2024-09-09 RX ORDER — SYRINGE-NEEDLE,INSULIN,0.5 ML 28GX1/2"
SYRINGE, EMPTY DISPOSABLE MISCELLANEOUS
Qty: 60 TABLET | Refills: 10 | Status: SHIPPED | OUTPATIENT
Start: 2024-09-09

## 2024-09-09 NOTE — PROGRESS NOTES
"History Of Present Illness  Lindsay Hauser is a 89 y.o. female presenting for diabetic nail care. Patient complains with painful elongated nails. Right great toenail ingrown    PCP Dr Berumen  Last visit 6/3/24     Past Medical History  She has no past medical history on file.    Surgical History  She has a past surgical history that includes Other surgical history (09/09/2019) and Other surgical history (09/26/2022).     Social History  She reports that she quit smoking about 7 years ago. Her smoking use included cigarettes. She started smoking about 57 years ago. She has a 50 pack-year smoking history. She has never used smokeless tobacco. She reports that she does not currently use alcohol. She reports that she does not use drugs.    Family History  No family history on file.     Allergies  Acetaminophen, Penicillins, and Sulfa (sulfonamide antibiotics)    Medications  Current Outpatient Medications   Medication Sig Dispense Refill    albuterol 90 mcg/actuation inhaler INHALE ONE (1) PUFF BY MOUTH EVERY 6 HOURS AS NEEDED 6.7 g 10    BD Ultra-Fine Mini Pen Needle 31 gauge x 3/16\" needle USE AS DIRECTED ONCE DAILY      calcium carbonate-vitamin D3 600 mg-5 mcg (200 unit) tablet Take 2 tablets by mouth once daily. 60 tablet 11    cholecalciferol (Vitamin D-3) 5,000 Units tablet Take by mouth.      diclofenac sodium 1 % kit Place 4 inches on the skin 2 times a day. 1 each 3    esomeprazole (NexIUM) 40 mg DR capsule Take by mouth.      famotidine (Pepcid) 20 mg tablet Take 1 tablet (20 mg) by mouth once daily.      fexofenadine (Allegra) 180 mg tablet Take 1 tablet (180 mg) by mouth once daily. 30 tablet 5    fluticasone (Flonase) 50 mcg/actuation nasal spray Administer 1 spray into each nostril once daily. 9.9 mL 11    fluticasone (Flonase) 50 mcg/actuation nasal spray Administer 1 spray into each nostril once daily. 16 g 11    fluticasone propion-salmeteroL (Advair Diskus) 250-50 mcg/dose diskus inhaler INHALE 1 PUFF " BY MOUTH EVERY 12 HOURS 60 each 2    insulin glargine (Lantus Solostar U-100 Insulin) 100 unit/mL (3 mL) pen INJECT UP TO 30 UNITS SUBCUTANEOUSLY AT BEDTIME *REPLACING LEVEMIR* 15 mL 10    lisinopril 10 mg tablet TAKE 1 TABLET BY MOUTH ONCE EVERY 24 HOURS 30 tablet 10    metFORMIN (Glucophage) 1,000 mg tablet TAKE 1 TABLET BY MOUTH TWICE DAILY WITH MEALS 60 tablet 10    Mucus Relief  mg 12 hr tablet TAKE 2 TABLETS BY MOUTH TWICE DAILY *DO NOT CRUSH, CHEW OR SPLIT* 60 tablet 10    nystatin-triamcinolone (Mycolog II) ointment APPLY TOPICALLY THREE TIMES A DAY 60 g 10    OneTouch Verio test strips strip USE TO TEST BLOOD SUGAR TWICE DAILY 100 strip 10    simvastatin (Zocor) 20 mg tablet TAKE 1 TABLET BY MOUTH ONCE DAILY AT BEDTIME 30 tablet 10     No current facility-administered medications for this visit.       Review of Systems    REVIEW OF SYSTEMS  GENERAL:  Negative for malaise, significant weight loss, fever  CARDIOVASCULAR: leg swelling   MUSCULOSKELETAL:  Negative for joint pain or swelling, back pain, and muscle pain.  SKIN:  Negative for lesions, rash, and itching  PSYCH:  Negative for sleep disturbance, mood disorder and recent psychosocial stressors  NEURO: Negative, denies any burning, tingling or numbness     Objective:   Vasc: DP and PT pulses are palpable bilateral.  CFT is less than 3 seconds bilateral.  Skin temperature is warm to cool proximal to distal bilateral.      Neuro:  Light touch is intact to the foot bilateral.       Derm: Nails 1-5 bilateral are thickened, elongated and crumbly with subungual debris. Ingrown first bilateral. Skin is supple with normal texture and turgor noted.  Webspaces are clean, dry and intact bilateral.  First toenail bilateral borders of right are ingrown and incurvated and very tender to touch    Ortho: Muscle strength is 5/5 for all pedal groups tested.  Ankle joint, subtalar joint, 1st MPJ and lesser MPJ ROM is full and without pain or crepitus.  The foot  type is rectus bilateral off weight bearing.  There are no structural deformities noted.    Assessment/Plan     DM  Painful nail mycosis    Painful ingrown toenails right first     Toenails are debrided in length and thickness to avoid infection and for pain relief   Patient has consented to a permanent partial nail avulsion both verbally and written. Patient understands that the toenail may grow back and could appear discolored, thickened, or with a fungal infection. Patient appears to understand and is in agreement with the plan.  All questions were answered to the patient's satisfaction with no guarantees given or implied.      Nail Removal Informed Consent included the following and was signed by patient or patients guardian:   I understand that this procedure involves injection of local anesthesia, and I have no known allergies to local anesthetics.  My physician and I have discussed risks, benefits, and potential complications of this procedure.  Risks include, but are not limited to: persistent bleeding, pain, prolonged healing, infection, allergic reaction, swelling, numbness/tingling, and recurrence.  Alternative treatment options were also discussed.  All questions have been answered to my satisfaction and no guarantees regarding the outcome of the procedure have been given or implied.  Aftercare requirements have been discussed and I intend to comply with recommendation.      Procedure: Partial Permanent Nail Avulsion - B/L Borders of R1 Toe    The digit was anesthetized with 3cc of 2% lidocaine plain utilizing a digit block. The area was sterily prepped and draped. The toe was cleansed with betadine. Digit tourniqauet applied. A spatula was used to separate the nail plate from the nail bed. An english anvil was used to separate the nail plate in a longitudinal fashion. The nail border was removed with hemostats. A curette was used to ensure no spicules remained.     Three separate applications of phenol  were applied to the matrix cells for 30 seconds each. It was ensured that the skin was protected from the chemical. The area was rinsed thoroughly with alcohol to neutralize the acid.     Digit tourniquet removed. A curette was used again to ensure no spicules remained.  Appropriate capillary refill time was confirmed.  The toe was then dressed with antibiotic ointment and a dry sterile dressing.  Patient was given extensive verbal and written homegoing instructions.      Homegoing instructions dispensed included:   The injection that you received prior to the procedure will have local anesthetic effects for the next 3-5 hours.  Following this, you may notice that the toe is sore with pressure.  Therefore, during healing, attempt to wear an open-toe or wider-toebox shoe.  Leave the bandage on your toe for the next 24 hours if possible.  Tomorrow, begin soaking the affected foot in a warm water bath, with or without Epsom salts.  Make sure to dry the toe completely before applying a dressing.   Change bandage on a daily basis beginning tomorrow following soaks.  Apply a small amount of topical antibiotic ointment with each dressing change.    Keep bandages on while you shower, and change bandage once you dry off.  Keep bandages on when you are wearing shoes and socks.  If a scab forms at the nail removal site, leave it in place.  Continue with daily soaking and bandage changes until you return in two weeks for your follow up appointment.  Call your physician immediately if you notice any increased redness, warmth, milky-appearing discharge, or other signs of infection at the nail removal site.  Call your physician immediately if you experience significant bleeding at the nail removal site.    Patient was advised to call the office or emergency advice line should they experience any problems, changes or worsening of symptoms, or have any questions. Alternatively, the patient was advised to go to the ED.    Pt  instructed to follow up in 2-3 weeks or sooner if necessary.

## 2024-09-16 ENCOUNTER — APPOINTMENT (OUTPATIENT)
Dept: PRIMARY CARE | Facility: CLINIC | Age: 89
End: 2024-09-16
Payer: COMMERCIAL

## 2024-09-16 VITALS
HEART RATE: 81 BPM | OXYGEN SATURATION: 92 % | TEMPERATURE: 97.2 F | HEIGHT: 61 IN | RESPIRATION RATE: 18 BRPM | BODY MASS INDEX: 27.41 KG/M2 | SYSTOLIC BLOOD PRESSURE: 110 MMHG | DIASTOLIC BLOOD PRESSURE: 60 MMHG | WEIGHT: 145.2 LBS

## 2024-09-16 DIAGNOSIS — M85.80 OSTEOPENIA, UNSPECIFIED LOCATION: ICD-10-CM

## 2024-09-16 DIAGNOSIS — B35.1 NAIL FUNGUS: ICD-10-CM

## 2024-09-16 DIAGNOSIS — E55.9 VITAMIN D DEFICIENCY: ICD-10-CM

## 2024-09-16 DIAGNOSIS — E11.00 TYPE 2 DIABETES MELLITUS WITH HYPEROSMOLARITY WITHOUT COMA, WITHOUT LONG-TERM CURRENT USE OF INSULIN (MULTI): Primary | ICD-10-CM

## 2024-09-16 DIAGNOSIS — Z00.00 ANNUAL PHYSICAL EXAM: ICD-10-CM

## 2024-09-16 DIAGNOSIS — J43.8 OTHER EMPHYSEMA (MULTI): ICD-10-CM

## 2024-09-16 DIAGNOSIS — E11.9 TYPE 2 DIABETES MELLITUS WITHOUT RETINOPATHY (MULTI): ICD-10-CM

## 2024-09-16 LAB — POC HEMOGLOBIN A1C: 8.1 % (ref 4.2–6.5)

## 2024-09-16 PROCEDURE — 99397 PER PM REEVAL EST PAT 65+ YR: CPT | Performed by: INTERNAL MEDICINE

## 2024-09-16 PROCEDURE — 3078F DIAST BP <80 MM HG: CPT | Performed by: INTERNAL MEDICINE

## 2024-09-16 PROCEDURE — 1123F ACP DISCUSS/DSCN MKR DOCD: CPT | Performed by: INTERNAL MEDICINE

## 2024-09-16 PROCEDURE — 1036F TOBACCO NON-USER: CPT | Performed by: INTERNAL MEDICINE

## 2024-09-16 PROCEDURE — 83036 HEMOGLOBIN GLYCOSYLATED A1C: CPT | Performed by: INTERNAL MEDICINE

## 2024-09-16 PROCEDURE — 1160F RVW MEDS BY RX/DR IN RCRD: CPT | Performed by: INTERNAL MEDICINE

## 2024-09-16 PROCEDURE — 99213 OFFICE O/P EST LOW 20 MIN: CPT | Performed by: INTERNAL MEDICINE

## 2024-09-16 PROCEDURE — 1159F MED LIST DOCD IN RCRD: CPT | Performed by: INTERNAL MEDICINE

## 2024-09-16 PROCEDURE — 3074F SYST BP LT 130 MM HG: CPT | Performed by: INTERNAL MEDICINE

## 2024-09-16 RX ORDER — ALENDRONATE SODIUM 70 MG/1
70 TABLET ORAL
Qty: 4 TABLET | Refills: 11 | Status: SHIPPED | OUTPATIENT
Start: 2024-09-16 | End: 2025-09-16

## 2024-09-16 RX ORDER — INSULIN GLARGINE 100 [IU]/ML
INJECTION, SOLUTION SUBCUTANEOUS
Qty: 15 ML | Refills: 10 | Status: SHIPPED | OUTPATIENT
Start: 2024-09-16

## 2024-09-16 ASSESSMENT — ENCOUNTER SYMPTOMS
BRUISES/BLEEDS EASILY: 1
NECK PAIN: 1
DIZZINESS: 1
WHEEZING: 1
PALPITATIONS: 0
DYSURIA: 0
CHILLS: 0
SLEEP DISTURBANCE: 0
ARTHRALGIAS: 0
CHEST TIGHTNESS: 0
SORE THROAT: 0
COUGH: 0
DIARRHEA: 0
CONSTIPATION: 0
SHORTNESS OF BREATH: 1
CONFUSION: 0
UNEXPECTED WEIGHT CHANGE: 0
ADENOPATHY: 0
VOMITING: 0
JOINT SWELLING: 0
NAUSEA: 0
WEAKNESS: 0
FATIGUE: 1
HEADACHES: 1
ABDOMINAL PAIN: 0

## 2024-09-16 NOTE — ASSESSMENT & PLAN NOTE
Orders:    insulin glargine (Lantus Solostar U-100 Insulin) 100 unit/mL (3 mL) pen; INJECT UP TO 35 UNITS SUBCUTANEOUSLY AT BEDTIME *REPLACING LEVEMIR*

## 2024-09-16 NOTE — PROGRESS NOTES
"Subjective   Lindsay Hauser is a 89 y.o. female who presents for Annual Exam (CPE).      CPE   Last HGBA1C  was 7.5 on 6.3.2024, today is 8.1   Checking BG at  home  daily , was 130 this AM at home   On oxygen 2 L at home   Daughter  in the room with patient  today  Halley   DEXA- 6.29.2024    Former smoker , quit 2017    Did see dr Escamilla  2 weeks ago - ingrown toe  nail R foot toe, still hurts , finished ATB TX for 5 days  yesterday , had teeth infection was on antb for that too.   No flu shot today         Review of Systems   Constitutional:  Positive for fatigue. Negative for chills and unexpected weight change.        Sometimes    HENT:  Negative for congestion, ear pain and sore throat.    Respiratory:  Positive for shortness of breath and wheezing. Negative for cough and chest tightness.    Cardiovascular:  Positive for leg swelling. Negative for palpitations.        L  foot swelling,no pain    Gastrointestinal:  Negative for abdominal pain, constipation, diarrhea, nausea and vomiting.   Genitourinary:  Negative for dysuria and urgency.   Musculoskeletal:  Positive for neck pain. Negative for arthralgias and joint swelling.        Sometimes    Skin:  Negative for rash.   Neurological:  Positive for dizziness and headaches. Negative for weakness.        Sometimes    Hematological:  Negative for adenopathy. Bruises/bleeds easily.   Psychiatric/Behavioral:  Negative for confusion and sleep disturbance.    All other systems reviewed and are negative.      Objective   /60 (BP Location: Left arm, Patient Position: Sitting)   Pulse 81   Temp 36.2 °C (97.2 °F)   Resp 18   Ht 1.549 m (5' 1\")   Wt 65.9 kg (145 lb 3.2 oz)   SpO2 92%   BMI 27.44 kg/m²       Physical Exam  Constitutional:       Appearance: Normal appearance.      Comments: Obese, has toe nail redness, no discharge   HENT:      Head: Normocephalic and atraumatic.   Eyes:      Conjunctiva/sclera: Conjunctivae normal.   Neck:      Vascular: No " carotid bruit.   Cardiovascular:      Rate and Rhythm: Normal rate and regular rhythm.      Heart sounds: No murmur heard.  Pulmonary:      Effort: No respiratory distress.      Breath sounds: No wheezing, rhonchi or rales.   Chest:      Chest wall: No tenderness.   Abdominal:      General: Bowel sounds are normal. There is no distension.      Palpations: Abdomen is soft. There is no mass.      Tenderness: There is no abdominal tenderness.   Musculoskeletal:         General: Normal range of motion.      Cervical back: Neck supple.      Right foot: No deformity, bunion or Charcot foot.      Left foot: No deformity, bunion or Charcot foot.   Feet:      Right foot:      Skin integrity: Skin integrity normal. No ulcer, callus or fissure.      Toenail Condition: Right toenails are normal.      Left foot:      Skin integrity: Skin integrity normal. No ulcer, callus or fissure.      Toenail Condition: Left toenails are normal.      Comments: Foot exam normal,   Lymphadenopathy:      Cervical: No cervical adenopathy.   Skin:     Coloration: Skin is not jaundiced.      Findings: No rash.   Neurological:      General: No focal deficit present.      Mental Status: She is alert and oriented to person, place, and time. Mental status is at baseline.      Motor: No weakness.      Gait: Gait normal.   Psychiatric:         Mood and Affect: Mood normal.         Behavior: Behavior normal.         Judgment: Judgment normal.       TECHNIQUE:  DEXA BONE DENSITY      FINDINGS:  SPINE L1-L4  Bone Mineral Density: 1.002  T-Score -1.5  Z-Score 0.5  Bone Mineral Density change vs baseline:  Not reported  Bone Mineral Density change vs previous: Not reported      LEFT FEMUR -TOTAL  Bone Mineral Density: 0.840  T-Score -1.3   Z-Score  1.1  Bone Mineral Density change vs baseline: Not reported  Bone Mineral Density change vs previous: Not reported      LEFT FEMUR -NECK  Bone Mineral Density: 0.701  T-Score -2.4  Z-Score 0.1  Bone Mineral Density  change vs baseline: Not reported  Bone Mineral Density change vs previous: Not reported      Assessment & Plan  Type 2 diabetes mellitus with hyperosmolarity without coma, without long-term current use of insulin (Multi)    Orders:    POCT glycosylated hemoglobin (Hb A1C) manually resulted    Vitamin D deficiency         Type 2 diabetes mellitus without retinopathy (Multi)    Orders:    insulin glargine (Lantus Solostar U-100 Insulin) 100 unit/mL (3 mL) pen; INJECT UP TO 35 UNITS SUBCUTANEOUSLY AT BEDTIME *REPLACING LEVEMIR*    Other emphysema (Multi)         Nail fungus         Annual physical exam         Osteopenia, unspecified location  2024 t _2.5  Has some dental work, done in 1 month  Will start fosamax after    Orders:    alendronate (Fosamax) 70 mg tablet; Take 1 tablet (70 mg) by mouth every 7 days. Take in the morning with a full glass of water, on an empty stomach, and do not take anything else by mouth or lie down for the next 30 min.         Nilson Berumen MD

## 2024-09-16 NOTE — PATIENT INSTRUCTIONS
Was nice seeing you today.  Continue same medication.  Have lab work done before next appointment if labs were ordered today.  Fu in 3 month.  Call/ contact our office with any concerns.    If you have labs or test done and you can't see the report in your chart or you didn't hear from us in 2 weeks after test/labs done , please, call our office for reports.  Please , do not assume that they were normal.    Any test results  and questions you might have , will be discussed at next visit -- please make sure to make a follow up appt after testing if reports are abnormal or you have questions.  To see eye dat tran with podiatry  To start fosamax after dental work done, will check with dentist  - Maintaining good vitamin D blood levels is important for bone health and overall health. Please see additional information on vitamin D below.  A vitamin D blood test, called vitamin D 25-hydroxy (OH) is obtained to assess vitamin D level. If the vitamin D is low, you will need to take a vitamin D supplement. If you are already on a vitamin D supplement, then the dose will need to be adjusted.  Also you multivitamin may contain vitamin D.  Vitamin D is a fat soluble vitamin that requires it be taken with good fat to be absorbed. Examples of healthy fat include nuts, seeds, avocados, olives and for the most part the meal of the day.  Spending up to 30 minutes in the sun during Summer and late Spring can provide natural vitamin D to the uncovered skin (arms, legs)    - Your calcium can be sufficient in your diet.  Healthy food that are rich in calcium include: nuts, seeds, legumes/beans, peas, dark green leafy vegetables, plant based milk  Additional calcium rich foods listed below  - Soaking Almonds overnight in the fridge with drinking water, can help with softening the almonds and improved absorption of the almonds. Please be careful not to break a tooth with dry raw almonds, or other hard nuts or seeds.  If your lab work shows  insufficient calcium or you are unable to consume sufficient foods rich in calcium, then a calcium supplement is recommended, such as calcium citrate.    - You can track your nutrition and calcium intake on www.[email or web address].com  This provides macro and micronutrient intake and requirements.  - You can track your calcium intake on cronometer.com or any other calcium tracker of your choice.  If you are not getting about 1200 mg of calcium daily, you will need to add a calcium supplement, such as calcium citrate to supplement you daily calcium so you can achieve your total 1200 mg daily  See additional information below on calcium.    - I recommend following a healthy lifestyle. You can find additional information below.      - You can track your nutrition and calcium intake on www.[email or web address].com  This provides macro and micronutrient intake and requirements.   GENERAL INFORMATION ON BONE HEALTH :   -Bone Density testing (DXA scan) as recommended.   -Vitamin D supplementation is recommended, unless blood levels are sufficient.   Recommended daily dose of 1000 to 2000 IU total a day, or the dose necessary to achieve a Vitamin D 25-OH blood level of >31 and preferably closer to 40-60 ng/mL.   Vitamin D pills are available over the counter.  -Recommended daily dose of calcium: 1200mg total a day in divided doses.  Calcium is usually sufficient in our regular diet, also available in multivitamins. Patients on certain dietary restrictions or those unable to meet their daily calcium by diety alone, may require calcium supplements. For patient with history of calcium kidney stones, Calcium Citrate would be the recommended supplement. It is recommended to avoid caclium carbonate supplement in this case, as these may increase risk of calcium kidney stones.  When you read a food label and you see calcium reported as DV %, add a zero and this will provide you with the approximate mg value of the calcium content in this food. For  example, if a glass of almond milk is labeled as 40% calcium DV, then this contains 400 mg of calcium.  -Regular weight-bearing and muscle-strengthening exercise   -Avoidance of tobacco smoking, excessive alcohol intake and excessive caffeine intake.   -Fall and fracture precautions   -It is recommend to continue regular follow up visits with your dentist every 6 months, and continue with good oral hygiene.    Calcium:   If your diet is sufficient in Calcium rich food, you will not need calcium supplement.    Calcium Citrate is the preferred calcium if you have had kidney stones.  Daily recommended calcium dose: 600mg twice a day with meals.    Adequate calcium ingestion is essential for maintaining healthy bones. The recommended dose daily intake of calcium varies depending on individual needs but is usually between 1200 and 1500mg daily, preferably around 1200mg a day in divided dose (not all taken at once). This is equivalent to about five 8oz glasses of milk per day.   Many foods are rich in calcium and they include:  - Plant based, non-dairy, calcium rich products, include nuts, almond milk, beans, lentils  - Vegetables and Fruit: bok-walter, turnips, broccoli, kale, collards,  - Dairy products: milk, cheese, yogurt, ice-cream  - Fish products: canned salmon, sardines and shrimp  - Cereals and nuts: almonds, sesame seeds, fortified cereals and oatmeal  - Other foods: fortified orange-juice, figs, soybeans, other beans and eggs.  If you have a low calcium diet and cannot tolerate calcium-rich foods, many supplements are available today. Your pharmacist can help you choose the one which best suits your needs. A few tips on supplements:  - They should be easy to swallow  - They should dissolve easily in ½ cup of vinegar in < 15 minutes.  - Count the ELEMENTAL calcium mgs. E.g. Calcium 499mg may have only 221mg of elemental Calcium.  - Calcium citrate is the calcium supplement to take if you have had kidney stones  and unable to meet your calcium requirements from food/diet alone.  - There is such a variety today that it is best to bring in the bottle to your doctor to show them exactly what you are taking.   Lastly too much calcium can be bad for you. Recent studies show extra supplements may increase your risk of kidney stones or cause high calcium levels in some people. You should discuss how much you should be taking with your doctor before starting them.  Further Information is available from the following resources:  www.nof.org (National Osteoporosis Foundation)  http://www.osteo.org/osteolinks.asp  [place] [facility]: 2-868-410-BONE  The Calcium Information [place]: 2-602-322-3632    -Non-Dairy, Plant based Milk, can contain in1 glass up to 450 mg of calcium (300 to 450 mg)  Exampled include Oat Milk, Flax Milk, [place] Milk, Cashew Milk, Soy Milk, Peas Milk  general health and well being    Examples of Food Sources of Calcium from [facility]  Food Milligrams (mg)  per serving Percent DV*   Soymilk, calcium-fortified, 8 ounces 299 30   Orange juice, calcium-fortified, 6 ounces 261 26   Tofu, firm, made with calcium sulfate, ½ cup* 253 25   Tofu, soft, made with calcium sulfate, ½ cup* 138 14   Ready-to-eat cereal, calcium-fortified, 1 cup 100-1,000    Turnip greens, fresh, boiled, ½ cup 99 10   Kale, raw, chopped, 1 cup 100 10   Kale, fresh, cooked, 1 cup 94 9   Chinese cabbage, bok walter, raw, shredded, 1 cup 74 7   Bread, white, 1 slice 73 7   Tortilla, corn, ready-to-bake/song, one 6” diameter 46 5   Tortilla, flour, ready-to-bake/song, one 6” diameter 32 3   Bread, whole-wheat, 1 slice 30 3   Broccoli, raw, ½ cup 21 2   * DV = Daily Value. DVs were developed by the U.S. Food and Drug Administration to help consumers compare the nutrient contents among products within the context of a total daily diet.   The U.S. Department of Agriculture’s (USDA’s) Nutrient Database Web site lists the nutrient content of many  foods and provides comprehensive list of foods containing calcium arranged by nutrient content and by food name.  *Calcium content varies slightly by fat content; the more fat, the less calcium the food contains.  * Calcium content is for tofu processed with a calcium salt. Tofu processed with other salts does not provide significant amounts of calcium.  You could acces this information online at: http://ods.od.nih.gov/factsheets/Calcium-HealthProfessional/    Vitamin D:   Vitamin D3= cholecalciferol, available over the counter.  Dose recommended 800 to 1000 iu daily with a meal; Certain patients require 5045-3889 iu daily and in patients deficient in Vitamin D, they require higher dosages.  Certain patient requires higher dose, depending on their Vit D blood levels.   Vitamin D is essential for calcium metabolism. It is really a hormone produced mainly in your skin after exposure to sunlight. Vitamin D helps you absorb calcium from your stomach and kidneys and incorporates it into your bones. Studies show approximately 50% of [place] men and women are vitamin D deficient in the winter. Milder cases of vitamin D are usually asymptomatic so the only way to know you have a problem is to have a blood level checked. More severe cases can cause osteomalacia (a.k.a. rickets) which can result in bone pain, weak bones and several abnormal laboratory tests and also weak muscles (a.k.a. myopathy). When this happens, your bones lose a lot of their calcium stores as the body tries to regulate the calcium required by other tissues. Prolonged deficiency can lead to severe bone disorders and fractures.  Unlike calcium, dietary sources of vitamin D are rare, limited to a few fish oils particularly cod-liver oil, other fortified foods and egg yolks. and most are unhealthy. Natural source of vitamin D is through sunshine. This is usually during stefano seasons, for example a 30 minute exposure to sunshine. Some people are unable  to be exposed to the sun due to skin condition. Often supplementation is needed. Many multivitamins contain some vitamin D and vitamin D alone preparations are now available in several forms. The recommended daily intake of vitamin D used to be 400 and 800 international units, however, it is now known that larger amounts are needed, as discussed above. Your doctor can prescribe prescription strength vitamin D for you if necessary, if you have marked deficiency or diseases of the liver or kidney. Supplementation in patients with severe deficiency can stabilize or improve bone mineral density and in frail elderly persons, may reduce their risk of falling.

## 2024-09-16 NOTE — ASSESSMENT & PLAN NOTE
2024 t _2.5  Has some dental work, done in 1 month  Will start fosamax after    Orders:    alendronate (Fosamax) 70 mg tablet; Take 1 tablet (70 mg) by mouth every 7 days. Take in the morning with a full glass of water, on an empty stomach, and do not take anything else by mouth or lie down for the next 30 min.

## 2024-10-04 DIAGNOSIS — J41.0 SIMPLE CHRONIC BRONCHITIS (MULTI): ICD-10-CM

## 2024-10-07 RX ORDER — FLUTICASONE PROPIONATE AND SALMETEROL 250; 50 UG/1; UG/1
1 POWDER RESPIRATORY (INHALATION) EVERY 12 HOURS
Qty: 60 EACH | Refills: 11 | Status: SHIPPED | OUTPATIENT
Start: 2024-10-07

## 2024-10-31 ENCOUNTER — APPOINTMENT (OUTPATIENT)
Dept: PRIMARY CARE | Facility: CLINIC | Age: 89
End: 2024-10-31
Payer: COMMERCIAL

## 2024-11-04 DIAGNOSIS — J41.0 SIMPLE CHRONIC BRONCHITIS (MULTI): Primary | ICD-10-CM

## 2024-11-05 RX ORDER — FEXOFENADINE HYDROCHLORIDE 180 MG/1
180 TABLET, FILM COATED ORAL DAILY
Qty: 30 TABLET | Refills: 10 | Status: SHIPPED | OUTPATIENT
Start: 2024-11-05

## 2024-11-14 DIAGNOSIS — E11.9 TYPE 2 DIABETES MELLITUS WITHOUT COMPLICATION, UNSPECIFIED WHETHER LONG TERM INSULIN USE (MULTI): Primary | ICD-10-CM

## 2024-11-14 RX ORDER — BLOOD-GLUCOSE METER
EACH MISCELLANEOUS
Qty: 100 STRIP | Refills: 10 | Status: SHIPPED | OUTPATIENT
Start: 2024-11-14

## 2024-12-23 ENCOUNTER — APPOINTMENT (OUTPATIENT)
Dept: PRIMARY CARE | Facility: CLINIC | Age: 89
End: 2024-12-23
Payer: COMMERCIAL

## 2024-12-23 ENCOUNTER — OFFICE VISIT (OUTPATIENT)
Dept: PRIMARY CARE | Facility: CLINIC | Age: 89
End: 2024-12-23
Payer: COMMERCIAL

## 2024-12-23 VITALS
TEMPERATURE: 97.1 F | OXYGEN SATURATION: 90 % | RESPIRATION RATE: 18 BRPM | DIASTOLIC BLOOD PRESSURE: 60 MMHG | HEART RATE: 82 BPM | HEIGHT: 61 IN | SYSTOLIC BLOOD PRESSURE: 110 MMHG | WEIGHT: 142.2 LBS | BODY MASS INDEX: 26.85 KG/M2

## 2024-12-23 DIAGNOSIS — I10 PRIMARY HYPERTENSION: ICD-10-CM

## 2024-12-23 DIAGNOSIS — E11.00 TYPE 2 DIABETES MELLITUS WITH HYPEROSMOLARITY WITHOUT COMA, WITHOUT LONG-TERM CURRENT USE OF INSULIN (MULTI): Primary | ICD-10-CM

## 2024-12-23 DIAGNOSIS — E11.22 TYPE 2 DIABETES MELLITUS WITH DIABETIC CHRONIC KIDNEY DISEASE, UNSPECIFIED CKD STAGE, UNSPECIFIED WHETHER LONG TERM INSULIN USE: ICD-10-CM

## 2024-12-23 DIAGNOSIS — E55.9 VITAMIN D DEFICIENCY: ICD-10-CM

## 2024-12-23 DIAGNOSIS — Z00.00 HEALTH CARE MAINTENANCE: ICD-10-CM

## 2024-12-23 DIAGNOSIS — J41.0 SIMPLE CHRONIC BRONCHITIS (MULTI): ICD-10-CM

## 2024-12-23 DIAGNOSIS — E78.5 DYSLIPIDEMIA: ICD-10-CM

## 2024-12-23 LAB — POC HEMOGLOBIN A1C: 7.6 % (ref 4.2–6.5)

## 2024-12-23 PROCEDURE — 1160F RVW MEDS BY RX/DR IN RCRD: CPT | Performed by: INTERNAL MEDICINE

## 2024-12-23 PROCEDURE — 3078F DIAST BP <80 MM HG: CPT | Performed by: INTERNAL MEDICINE

## 2024-12-23 PROCEDURE — 83036 HEMOGLOBIN GLYCOSYLATED A1C: CPT | Performed by: INTERNAL MEDICINE

## 2024-12-23 PROCEDURE — 1036F TOBACCO NON-USER: CPT | Performed by: INTERNAL MEDICINE

## 2024-12-23 PROCEDURE — 1159F MED LIST DOCD IN RCRD: CPT | Performed by: INTERNAL MEDICINE

## 2024-12-23 PROCEDURE — 1123F ACP DISCUSS/DSCN MKR DOCD: CPT | Performed by: INTERNAL MEDICINE

## 2024-12-23 PROCEDURE — 99213 OFFICE O/P EST LOW 20 MIN: CPT | Performed by: INTERNAL MEDICINE

## 2024-12-23 PROCEDURE — 3074F SYST BP LT 130 MM HG: CPT | Performed by: INTERNAL MEDICINE

## 2024-12-23 RX ORDER — SIMVASTATIN 20 MG/1
20 TABLET, FILM COATED ORAL NIGHTLY
Qty: 30 TABLET | Refills: 10 | Status: SHIPPED | OUTPATIENT
Start: 2024-12-23

## 2024-12-23 ASSESSMENT — ENCOUNTER SYMPTOMS
ABDOMINAL PAIN: 0
WHEEZING: 0
CHEST TIGHTNESS: 0
SORE THROAT: 0
CONFUSION: 0
CONSTIPATION: 0
ARTHRALGIAS: 0
NAUSEA: 0
COUGH: 0
ADENOPATHY: 0
DIARRHEA: 0
PALPITATIONS: 0
SLEEP DISTURBANCE: 0
VOMITING: 0
DYSURIA: 0
DIZZINESS: 0
CHILLS: 0
SHORTNESS OF BREATH: 0
FATIGUE: 0
UNEXPECTED WEIGHT CHANGE: 0
JOINT SWELLING: 0
WEAKNESS: 0

## 2024-12-23 ASSESSMENT — PATIENT HEALTH QUESTIONNAIRE - PHQ9
2. FEELING DOWN, DEPRESSED OR HOPELESS: SEVERAL DAYS
SUM OF ALL RESPONSES TO PHQ9 QUESTIONS 1 AND 2: 2
10. IF YOU CHECKED OFF ANY PROBLEMS, HOW DIFFICULT HAVE THESE PROBLEMS MADE IT FOR YOU TO DO YOUR WORK, TAKE CARE OF THINGS AT HOME, OR GET ALONG WITH OTHER PEOPLE: SOMEWHAT DIFFICULT
1. LITTLE INTEREST OR PLEASURE IN DOING THINGS: SEVERAL DAYS

## 2024-12-23 NOTE — PROGRESS NOTES
Subjective   Lindsay Hauser is a 89 y.o. female who presents for Follow-up (3 months follow up DM ).  3 months follow up DM     Last HGBA1C was 8. 1  on 9.16.24 , today  is 7.6   Checking BG  at home    DM eye  exam ,patient seen 2 times  per year  by eye dr , seen recently - dr Brown - 9.27.2024   Daughter Halley in the room  with  patient  today .      Review of Systems   Constitutional:  Negative for chills, fatigue and unexpected weight change.        Comment   HENT:  Negative for congestion, ear pain and sore throat.    Respiratory:  Negative for cough, chest tightness, shortness of breath and wheezing.    Cardiovascular:  Negative for palpitations and leg swelling.   Gastrointestinal:  Negative for abdominal pain, constipation, diarrhea, nausea and vomiting.   Genitourinary:  Negative for dysuria and urgency.   Musculoskeletal:  Negative for arthralgias and joint swelling.   Skin:  Negative for rash.   Neurological:  Negative for dizziness and weakness.   Hematological:  Negative for adenopathy.   Psychiatric/Behavioral:  Negative for confusion and sleep disturbance.        Objective   Physical Exam  Constitutional:       Appearance: Normal appearance.   HENT:      Head: Normocephalic and atraumatic.   Eyes:      Pupils: Pupils are equal, round, and reactive to light.   Cardiovascular:      Rate and Rhythm: Normal rate and regular rhythm.   Pulmonary:      Effort: Pulmonary effort is normal.      Breath sounds: Normal breath sounds.   Musculoskeletal:         General: Normal range of motion.      Cervical back: Normal range of motion and neck supple.   Skin:     General: Skin is warm.   Neurological:      General: No focal deficit present.      Mental Status: She is alert and oriented to person, place, and time.   Psychiatric:         Mood and Affect: Mood normal.         Behavior: Behavior normal.       /60 (BP Location: Left arm, Patient Position: Sitting)   Pulse 82   Temp 36.2 °C (97.1 °F)   Resp  "18   Ht 1.549 m (5' 1\")   Wt 64.5 kg (142 lb 3.2 oz)   SpO2 90%   BMI 26.87 kg/m²       Assessment/Plan   Problem List Items Addressed This Visit       COPD (chronic obstructive pulmonary disease) (Multi)     Coughing  with expectoration  for many years , better with mucinex.         Dyslipidemia    Relevant Medications    simvastatin (Zocor) 20 mg tablet    Other Relevant Orders    Lipid Panel    Hypertension    Relevant Orders    CBC and Auto Differential    TSH with reflex to Free T4 if abnormal    Urinalysis with Reflex Microscopic    Vitamin B12    Magnesium    Vitamin D deficiency    Relevant Orders    Vitamin D 25-Hydroxy,Total (for eval of Vitamin D levels)    Type 2 diabetes mellitus - Primary    Relevant Orders    POCT glycosylated hemoglobin (Hb A1C) manually resulted (Completed)    CBC and Auto Differential    Albumin-Creatinine Ratio, Urine Random    Comprehensive Metabolic Panel    Type 2 diabetes mellitus with diabetic chronic kidney disease, unspecified CKD stage, unspecified whether long term insulin use     Hbha1c 7.6          Relevant Orders    Comprehensive Metabolic Panel     Other Visit Diagnoses       Health care maintenance                Patient was identified as a fall risk. Risk prevention instructions provided.  "

## 2024-12-27 DIAGNOSIS — Z00.00 HEALTHCARE MAINTENANCE: ICD-10-CM

## 2024-12-27 RX ORDER — ALBUTEROL SULFATE 90 UG/1
2 INHALANT RESPIRATORY (INHALATION) EVERY 6 HOURS PRN
Qty: 6.7 G | Refills: 10 | Status: SHIPPED | OUTPATIENT
Start: 2024-12-27

## 2025-01-20 ENCOUNTER — APPOINTMENT (OUTPATIENT)
Dept: PODIATRY | Facility: CLINIC | Age: 89
End: 2025-01-20
Payer: COMMERCIAL

## 2025-01-20 DIAGNOSIS — M79.675 CHRONIC PAIN OF TOE OF LEFT FOOT: ICD-10-CM

## 2025-01-20 DIAGNOSIS — E11.9 TYPE 2 DIABETES MELLITUS WITHOUT RETINOPATHY (MULTI): Primary | ICD-10-CM

## 2025-01-20 DIAGNOSIS — G89.29 CHRONIC PAIN OF TOE OF LEFT FOOT: ICD-10-CM

## 2025-01-20 DIAGNOSIS — G89.29 CHRONIC PAIN OF TOE OF RIGHT FOOT: ICD-10-CM

## 2025-01-20 DIAGNOSIS — B35.1 NAIL FUNGUS: ICD-10-CM

## 2025-01-20 DIAGNOSIS — M79.674 CHRONIC PAIN OF TOE OF RIGHT FOOT: ICD-10-CM

## 2025-01-20 PROCEDURE — 11721 DEBRIDE NAIL 6 OR MORE: CPT | Performed by: PODIATRIST

## 2025-01-20 NOTE — PROGRESS NOTES
"History Of Present Illness  Lindsay Hauser is a 89 y.o. female presenting for diabetic nail care. Patient complains with painful elongated nails.       PCP  Nilson Berumen MD  Last visit 12/23/24     Past Medical History  She has no past medical history on file.    Surgical History  She has a past surgical history that includes Other surgical history (09/09/2019) and Other surgical history (09/26/2022).     Social History  She reports that she quit smoking about 8 years ago. Her smoking use included cigarettes. She started smoking about 58 years ago. She has a 50 pack-year smoking history. She has never used smokeless tobacco. She reports that she does not currently use alcohol. She reports that she does not use drugs.    Family History  No family history on file.     Allergies  Acetaminophen, Penicillins, and Sulfa (sulfonamide antibiotics)    Medications  Current Outpatient Medications   Medication Sig Dispense Refill    albuterol 90 mcg/actuation inhaler Inhale 2 puffs every 6 hours if needed for wheezing. 6.7 g 10    alendronate (Fosamax) 70 mg tablet Take 1 tablet (70 mg) by mouth every 7 days. Take in the morning with a full glass of water, on an empty stomach, and do not take anything else by mouth or lie down for the next 30 min. 4 tablet 11    Allergy Relief, fexofenadine, 180 mg tablet TAKE 1 TABLET BY MOUTH ONCE DAILY 30 tablet 10    BD Ultra-Fine Mini Pen Needle 31 gauge x 3/16\" needle USE AS DIRECTED ONCE DAILY      calcium carbonate-vitamin D3 600 mg-5 mcg (200 unit) tablet Take 2 tablets by mouth once daily. 60 tablet 11    diclofenac sodium 1 % kit Place 4 inches on the skin 2 times a day. 1 each 3    esomeprazole (NexIUM) 40 mg DR capsule Take by mouth.      fluticasone (Flonase) 50 mcg/actuation nasal spray Administer 1 spray into each nostril once daily. 9.9 mL 11    fluticasone propion-salmeteroL (Advair Diskus) 250-50 mcg/dose diskus inhaler INHALE 1 PUFF BY MOUTH EVERY 12 HOURS 60 each 11    " insulin glargine (Lantus Solostar U-100 Insulin) 100 unit/mL (3 mL) pen INJECT UP TO 35 UNITS SUBCUTANEOUSLY AT BEDTIME *REPLACING LEVEMIR* 15 mL 10    lisinopril 10 mg tablet TAKE 1 TABLET BY MOUTH ONCE EVERY 24 HOURS 30 tablet 10    metFORMIN (Glucophage) 1,000 mg tablet TAKE 1 TABLET BY MOUTH TWICE DAILY WITH MEALS 60 tablet 10    Mucus Relief  mg 12 hr tablet TAKE 2 TABLETS BY MOUTH TWICE DAILY *DO NOT CRUSH, CHEW OR SPLIT* 60 tablet 10    nystatin-triamcinolone (Mycolog II) ointment APPLY TOPICALLY THREE TIMES A DAY 60 g 10    OneTouch Verio test strips strip USE TO TEST BLOOD SUGAR TWICE DAILY 100 strip 10    simvastatin (Zocor) 20 mg tablet Take 1 tablet (20 mg) by mouth once daily at bedtime. 30 tablet 10     No current facility-administered medications for this visit.       Review of Systems    REVIEW OF SYSTEMS  GENERAL:  Negative for malaise, significant weight loss, fever  CARDIOVASCULAR: leg swelling   MUSCULOSKELETAL:  Negative for joint pain or swelling, back pain, and muscle pain.  SKIN:  Negative for lesions, rash, and itching  PSYCH:  Negative for sleep disturbance, mood disorder and recent psychosocial stressors  NEURO: Negative, denies any burning, tingling or numbness     Objective:   Vasc: DP and PT pulses are palpable bilateral.  CFT is less than 3 seconds bilateral.  Skin temperature is warm to cool proximal to distal bilateral.      Neuro:  Light touch is intact to the foot bilateral.       Derm: Nails 1-5 bilateral are thickened, elongated and crumbly with subungual debris. Ingrown first bilateral. Skin is supple with normal texture and turgor noted.  Webspaces are clean, dry and intact bilateral.    Ortho: Muscle strength is 5/5 for all pedal groups tested.  Ankle joint, subtalar joint, 1st MPJ and lesser MPJ ROM is full and without pain or crepitus.  The foot type is rectus bilateral off weight bearing.  There are no structural deformities noted.    Assessment/Plan     DM  Painful  nail mycosis    Toenails are debrided in length and thickness to avoid infection and for pain relief

## 2025-03-24 ENCOUNTER — APPOINTMENT (OUTPATIENT)
Dept: PODIATRY | Facility: CLINIC | Age: 89
End: 2025-03-24
Payer: COMMERCIAL

## 2025-03-24 DIAGNOSIS — E11.9 TYPE 2 DIABETES MELLITUS WITHOUT RETINOPATHY (MULTI): Primary | ICD-10-CM

## 2025-03-24 DIAGNOSIS — M79.674 PAIN IN TOES OF BOTH FEET: ICD-10-CM

## 2025-03-24 DIAGNOSIS — M79.675 PAIN IN TOES OF BOTH FEET: ICD-10-CM

## 2025-03-24 DIAGNOSIS — B35.1 NAIL FUNGUS: ICD-10-CM

## 2025-03-24 PROCEDURE — 11721 DEBRIDE NAIL 6 OR MORE: CPT | Performed by: PODIATRIST

## 2025-03-24 NOTE — PROGRESS NOTES
"History Of Present Illness  Lindsay Hauser is a 89 y.o. female presenting for diabetic nail care. Patient complains with painful elongated nails.       PCP  Nilson Berumen MD  Last visit 12/23/24     Past Medical History  She has no past medical history on file.    Surgical History  She has a past surgical history that includes Other surgical history (09/09/2019) and Other surgical history (09/26/2022).     Social History  She reports that she quit smoking about 8 years ago. Her smoking use included cigarettes. She started smoking about 58 years ago. She has a 50 pack-year smoking history. She has never used smokeless tobacco. She reports that she does not currently use alcohol. She reports that she does not use drugs.    Family History  No family history on file.     Allergies  Acetaminophen, Penicillins, and Sulfa (sulfonamide antibiotics)    Medications  Current Outpatient Medications   Medication Sig Dispense Refill    albuterol 90 mcg/actuation inhaler Inhale 2 puffs every 6 hours if needed for wheezing. 6.7 g 10    alendronate (Fosamax) 70 mg tablet Take 1 tablet (70 mg) by mouth every 7 days. Take in the morning with a full glass of water, on an empty stomach, and do not take anything else by mouth or lie down for the next 30 min. 4 tablet 11    Allergy Relief, fexofenadine, 180 mg tablet TAKE 1 TABLET BY MOUTH ONCE DAILY 30 tablet 10    BD Ultra-Fine Mini Pen Needle 31 gauge x 3/16\" needle USE AS DIRECTED ONCE DAILY      calcium carbonate-vitamin D3 600 mg-5 mcg (200 unit) tablet Take 2 tablets by mouth once daily. 60 tablet 11    diclofenac sodium 1 % kit Place 4 inches on the skin 2 times a day. 1 each 3    esomeprazole (NexIUM) 40 mg DR capsule Take by mouth.      fluticasone (Flonase) 50 mcg/actuation nasal spray Administer 1 spray into each nostril once daily. 9.9 mL 11    fluticasone propion-salmeteroL (Advair Diskus) 250-50 mcg/dose diskus inhaler INHALE 1 PUFF BY MOUTH EVERY 12 HOURS 60 each 11    " insulin glargine (Lantus Solostar U-100 Insulin) 100 unit/mL (3 mL) pen INJECT UP TO 35 UNITS SUBCUTANEOUSLY AT BEDTIME *REPLACING LEVEMIR* 15 mL 10    lisinopril 10 mg tablet TAKE 1 TABLET BY MOUTH ONCE EVERY 24 HOURS 30 tablet 10    metFORMIN (Glucophage) 1,000 mg tablet TAKE 1 TABLET BY MOUTH TWICE DAILY WITH MEALS 60 tablet 10    Mucus Relief  mg 12 hr tablet TAKE 2 TABLETS BY MOUTH TWICE DAILY *DO NOT CRUSH, CHEW OR SPLIT* 60 tablet 10    nystatin-triamcinolone (Mycolog II) ointment APPLY TOPICALLY THREE TIMES A DAY 60 g 10    OneTouch Verio test strips strip USE TO TEST BLOOD SUGAR TWICE DAILY 100 strip 10    simvastatin (Zocor) 20 mg tablet Take 1 tablet (20 mg) by mouth once daily at bedtime. 30 tablet 10     No current facility-administered medications for this visit.       Review of Systems    REVIEW OF SYSTEMS  GENERAL:  Negative for malaise, significant weight loss, fever  CARDIOVASCULAR: leg swelling   MUSCULOSKELETAL:  Negative for joint pain or swelling, back pain, and muscle pain.  SKIN:  Negative for lesions, rash, and itching  PSYCH:  Negative for sleep disturbance, mood disorder and recent psychosocial stressors  NEURO: Negative, denies any burning, tingling or numbness     Objective:   Vasc: DP and PT pulses are palpable bilateral.  CFT is less than 3 seconds bilateral.  Skin temperature is warm to cool proximal to distal bilateral.      Neuro:  Light touch is intact to the foot bilateral.  No clonus     Derm: Nails 1-5 bilateral are thickened, elongated and crumbly with subungual debris. Ingrown first bilateral. Skin is supple with normal texture and turgor noted.  Webspaces are clean, dry and intact bilateral.    Ortho: Muscle strength is 5/5 for all pedal groups tested.  Ankle joint, subtalar joint, 1st MPJ and lesser MPJ ROM is full and without pain or crepitus.  The foot type is rectus bilateral off weight bearing.  There are no structural deformities noted.    Assessment/Plan      DM  Painful nail mycosis    Toenails are debrided in length and thickness to avoid infection and for pain relief

## 2025-03-25 LAB
25(OH)D3+25(OH)D2 SERPL-MCNC: 45 NG/ML (ref 30–100)
ALBUMIN SERPL-MCNC: 4.3 G/DL (ref 3.6–5.1)
ALBUMIN/CREAT UR: 16 MG/G CREAT
ALP SERPL-CCNC: 63 U/L (ref 37–153)
ALT SERPL-CCNC: 9 U/L (ref 6–29)
ANION GAP SERPL CALCULATED.4IONS-SCNC: 11 MMOL/L (CALC) (ref 7–17)
APPEARANCE UR: CLEAR
AST SERPL-CCNC: 14 U/L (ref 10–35)
BACTERIA #/AREA URNS HPF: ABNORMAL /HPF
BASOPHILS # BLD AUTO: 65 CELLS/UL (ref 0–200)
BASOPHILS NFR BLD AUTO: 0.6 %
BILIRUB SERPL-MCNC: 0.5 MG/DL (ref 0.2–1.2)
BILIRUB UR QL STRIP: NEGATIVE
BUN SERPL-MCNC: 14 MG/DL (ref 7–25)
CALCIUM SERPL-MCNC: 9.4 MG/DL (ref 8.6–10.4)
CHLORIDE SERPL-SCNC: 97 MMOL/L (ref 98–110)
CHOLEST SERPL-MCNC: 170 MG/DL
CHOLEST/HDLC SERPL: 3.1 (CALC)
CO2 SERPL-SCNC: 31 MMOL/L (ref 20–32)
COLOR UR: ABNORMAL
CREAT SERPL-MCNC: 0.59 MG/DL (ref 0.6–0.95)
CREAT UR-MCNC: 147 MG/DL (ref 20–275)
EGFRCR SERPLBLD CKD-EPI 2021: 87 ML/MIN/1.73M2
EOSINOPHIL # BLD AUTO: 270 CELLS/UL (ref 15–500)
EOSINOPHIL NFR BLD AUTO: 2.5 %
ERYTHROCYTE [DISTWIDTH] IN BLOOD BY AUTOMATED COUNT: 11.7 % (ref 11–15)
GLUCOSE SERPL-MCNC: 194 MG/DL (ref 65–99)
GLUCOSE UR QL STRIP: ABNORMAL
HCT VFR BLD AUTO: 43.8 % (ref 35–45)
HDLC SERPL-MCNC: 55 MG/DL
HGB BLD-MCNC: 13.7 G/DL (ref 11.7–15.5)
HGB UR QL STRIP: ABNORMAL
HYALINE CASTS #/AREA URNS LPF: ABNORMAL /LPF
KETONES UR QL STRIP: ABNORMAL
LDLC SERPL CALC-MCNC: 95 MG/DL (CALC)
LEUKOCYTE ESTERASE UR QL STRIP: ABNORMAL
LYMPHOCYTES # BLD AUTO: 1782 CELLS/UL (ref 850–3900)
LYMPHOCYTES NFR BLD AUTO: 16.5 %
MAGNESIUM SERPL-MCNC: 1.8 MG/DL (ref 1.5–2.5)
MCH RBC QN AUTO: 27.7 PG (ref 27–33)
MCHC RBC AUTO-ENTMCNC: 31.3 G/DL (ref 32–36)
MCV RBC AUTO: 88.7 FL (ref 80–100)
MICROALBUMIN UR-MCNC: 2.3 MG/DL
MONOCYTES # BLD AUTO: 950 CELLS/UL (ref 200–950)
MONOCYTES NFR BLD AUTO: 8.8 %
NEUTROPHILS # BLD AUTO: 7733 CELLS/UL (ref 1500–7800)
NEUTROPHILS NFR BLD AUTO: 71.6 %
NITRITE UR QL STRIP: NEGATIVE
NONHDLC SERPL-MCNC: 115 MG/DL (CALC)
PH UR STRIP: 5.5 [PH] (ref 5–8)
PLATELET # BLD AUTO: 384 THOUSAND/UL (ref 140–400)
PMV BLD REES-ECKER: 9 FL (ref 7.5–12.5)
POTASSIUM SERPL-SCNC: 4.8 MMOL/L (ref 3.5–5.3)
PROT SERPL-MCNC: 7.1 G/DL (ref 6.1–8.1)
PROT UR QL STRIP: ABNORMAL
RBC # BLD AUTO: 4.94 MILLION/UL (ref 3.8–5.1)
RBC #/AREA URNS HPF: ABNORMAL /HPF
SERVICE CMNT-IMP: ABNORMAL
SODIUM SERPL-SCNC: 139 MMOL/L (ref 135–146)
SP GR UR STRIP: 1.02 (ref 1–1.03)
SQUAMOUS #/AREA URNS HPF: ABNORMAL /HPF
T4 FREE SERPL-MCNC: 1.3 NG/DL (ref 0.8–1.8)
TRIGL SERPL-MCNC: 108 MG/DL
TSH SERPL-ACNC: 0.32 MIU/L (ref 0.4–4.5)
VIT B12 SERPL-MCNC: 351 PG/ML (ref 200–1100)
WBC # BLD AUTO: 10.8 THOUSAND/UL (ref 3.8–10.8)
WBC #/AREA URNS HPF: ABNORMAL /HPF

## 2025-03-28 ENCOUNTER — TELEPHONE (OUTPATIENT)
Dept: PRIMARY CARE | Facility: CLINIC | Age: 89
End: 2025-03-28
Payer: COMMERCIAL

## 2025-03-31 ENCOUNTER — APPOINTMENT (OUTPATIENT)
Dept: PRIMARY CARE | Facility: CLINIC | Age: 89
End: 2025-03-31
Payer: COMMERCIAL

## 2025-04-01 ENCOUNTER — TELEPHONE (OUTPATIENT)
Dept: PRIMARY CARE | Facility: CLINIC | Age: 89
End: 2025-04-01
Payer: COMMERCIAL

## 2025-04-01 NOTE — TELEPHONE ENCOUNTER
Marixa Juarez from [facility] can take a Verbal if.  Dr Jamaica argueta for home health     Marixa can be reached at [phone]      Please advise      Thank you!

## 2025-04-03 ENCOUNTER — TELEPHONE (OUTPATIENT)
Dept: PRIMARY CARE | Facility: CLINIC | Age: 89
End: 2025-04-03
Payer: COMMERCIAL

## 2025-04-03 NOTE — TELEPHONE ENCOUNTER
Patient's daughter Halley called with update. Her mom is still in hospital not doing well after surgery. She has a feeding tube and is incubated. If you have any questions you are welcome to call her daughter Halley at [phone]      Please advise

## 2025-04-08 ENCOUNTER — TELEPHONE (OUTPATIENT)
Dept: PRIMARY CARE | Facility: CLINIC | Age: 89
End: 2025-04-08
Payer: COMMERCIAL

## 2025-04-08 NOTE — TELEPHONE ENCOUNTER
Lucho patient's  just wanted to make Dr Berumen aware that patient was admitted to [facility] nursing on Monday 4/7/25    Lucho can be reached at [phone] if any questions.

## 2025-04-09 ENCOUNTER — TELEPHONE (OUTPATIENT)
Dept: PRIMARY CARE | Facility: CLINIC | Age: 89
End: 2025-04-09
Payer: COMMERCIAL

## 2025-04-09 NOTE — TELEPHONE ENCOUNTER
I spoke to patients daughter she updated that patient had appendix taken out as well as part of large intestine where there was an obstruction. She is now at skilled nursing at [facility]     It was mention patient needs referral to Urologist is that something that Dr Berumen can do ?     Surgeon Dr Berg can be reached at [phone] if any questions    Or feel free to contact daughter Halley.    Please advise

## 2025-04-09 NOTE — TELEPHONE ENCOUNTER
Daughter Halley would like a call to update on moms health since hospitalized .    Halley can be reached at [phone]      Thank you !

## 2025-05-01 ENCOUNTER — TELEPHONE (OUTPATIENT)
Dept: PRIMARY CARE | Facility: CLINIC | Age: 89
End: 2025-05-01
Payer: COMMERCIAL

## 2025-05-01 NOTE — TELEPHONE ENCOUNTER
Patient has seen urologist and she leaves nursing home tomorrow 5/2/25     Daughter can be reached at [phone]

## 2025-05-01 NOTE — TELEPHONE ENCOUNTER
Talked  to daughter  patient  still in NH today   she  should  come  home  tomorrow,has amandeep with us  Monday 5.5.2025 , will bring in all the  paperwork, she  is ding  much better  today, she  is  on oxygen  ,today  was 98% , she  si moving around the room ,  did see  the  specialist dr  will let us  know  about all Monday, she  is been  going between constipation and  diarrhea , eating better , not using that  much the  pain medication , over all she is better, thank you  for  calling

## 2025-05-02 ENCOUNTER — TELEPHONE (OUTPATIENT)
Dept: PRIMARY CARE | Facility: CLINIC | Age: 89
End: 2025-05-02
Payer: COMMERCIAL

## 2025-05-02 NOTE — TELEPHONE ENCOUNTER
Chilo RN needs Verbal ok. Dr GIRON will follow skilled home care for patient .    Chilo [phone]      Please advise

## 2025-05-05 ENCOUNTER — HOSPITAL ENCOUNTER (OUTPATIENT)
Dept: RADIOLOGY | Facility: CLINIC | Age: 89
Discharge: HOME | End: 2025-05-05
Payer: MEDICARE

## 2025-05-05 ENCOUNTER — DOCUMENTATION (OUTPATIENT)
Dept: PRIMARY CARE | Facility: CLINIC | Age: 89
End: 2025-05-05

## 2025-05-05 ENCOUNTER — APPOINTMENT (OUTPATIENT)
Dept: PRIMARY CARE | Facility: CLINIC | Age: 89
End: 2025-05-05
Payer: COMMERCIAL

## 2025-05-05 VITALS
DIASTOLIC BLOOD PRESSURE: 58 MMHG | OXYGEN SATURATION: 97 % | SYSTOLIC BLOOD PRESSURE: 116 MMHG | HEART RATE: 100 BPM | HEIGHT: 61 IN | TEMPERATURE: 96.4 F | BODY MASS INDEX: 24.92 KG/M2 | WEIGHT: 132 LBS | RESPIRATION RATE: 16 BRPM

## 2025-05-05 DIAGNOSIS — Z09 HOSPITAL DISCHARGE FOLLOW-UP: ICD-10-CM

## 2025-05-05 DIAGNOSIS — M54.40 ACUTE BILATERAL LOW BACK PAIN WITH SCIATICA, SCIATICA LATERALITY UNSPECIFIED: ICD-10-CM

## 2025-05-05 DIAGNOSIS — C18.9: Primary | ICD-10-CM

## 2025-05-05 DIAGNOSIS — E11.22 TYPE 2 DIABETES MELLITUS WITH DIABETIC CHRONIC KIDNEY DISEASE, UNSPECIFIED CKD STAGE, UNSPECIFIED WHETHER LONG TERM INSULIN USE: ICD-10-CM

## 2025-05-05 DIAGNOSIS — M79.604 PAIN IN BOTH LOWER EXTREMITIES: ICD-10-CM

## 2025-05-05 DIAGNOSIS — I10 PRIMARY HYPERTENSION: ICD-10-CM

## 2025-05-05 DIAGNOSIS — M79.605 PAIN IN BOTH LOWER EXTREMITIES: ICD-10-CM

## 2025-05-05 DIAGNOSIS — J43.8 OTHER EMPHYSEMA (MULTI): ICD-10-CM

## 2025-05-05 LAB — POC HEMOGLOBIN A1C: 6.9 % (ref 4.2–6.5)

## 2025-05-05 PROCEDURE — 3074F SYST BP LT 130 MM HG: CPT | Performed by: INTERNAL MEDICINE

## 2025-05-05 PROCEDURE — 1159F MED LIST DOCD IN RCRD: CPT | Performed by: INTERNAL MEDICINE

## 2025-05-05 PROCEDURE — 72100 X-RAY EXAM L-S SPINE 2/3 VWS: CPT

## 2025-05-05 PROCEDURE — 1123F ACP DISCUSS/DSCN MKR DOCD: CPT | Performed by: INTERNAL MEDICINE

## 2025-05-05 PROCEDURE — 83036 HEMOGLOBIN GLYCOSYLATED A1C: CPT | Performed by: INTERNAL MEDICINE

## 2025-05-05 PROCEDURE — 1036F TOBACCO NON-USER: CPT | Performed by: INTERNAL MEDICINE

## 2025-05-05 PROCEDURE — 3078F DIAST BP <80 MM HG: CPT | Performed by: INTERNAL MEDICINE

## 2025-05-05 PROCEDURE — 99496 TRANSJ CARE MGMT HIGH F2F 7D: CPT | Performed by: INTERNAL MEDICINE

## 2025-05-05 PROCEDURE — 72100 X-RAY EXAM L-S SPINE 2/3 VWS: CPT | Performed by: RADIOLOGY

## 2025-05-05 PROCEDURE — 1160F RVW MEDS BY RX/DR IN RCRD: CPT | Performed by: INTERNAL MEDICINE

## 2025-05-05 RX ORDER — DICLOFENAC SODIUM 10 MG/G
4 GEL TOPICAL 4 TIMES DAILY
Qty: 60 G | Refills: 3 | Status: SHIPPED | OUTPATIENT
Start: 2025-05-05

## 2025-05-05 RX ORDER — BISACODYL 10 MG/1
SUPPOSITORY RECTAL
COMMUNITY
Start: 2025-04-07 | End: 2025-05-05 | Stop reason: ALTCHOICE

## 2025-05-05 RX ORDER — POLYETHYLENE GLYCOL 3350 17 G/17G
17 POWDER, FOR SOLUTION ORAL
COMMUNITY
Start: 2025-04-07

## 2025-05-05 RX ORDER — ENOXAPARIN SODIUM 100 MG/ML
40 INJECTION SUBCUTANEOUS
COMMUNITY
Start: 2025-04-07 | End: 2025-05-05 | Stop reason: ALTCHOICE

## 2025-05-05 RX ORDER — DULOXETIN HYDROCHLORIDE 20 MG/1
40 CAPSULE, DELAYED RELEASE ORAL DAILY
Qty: 60 CAPSULE | Refills: 5 | Status: SHIPPED | OUTPATIENT
Start: 2025-05-05 | End: 2025-11-01

## 2025-05-05 RX ORDER — METHOCARBAMOL 500 MG/1
TABLET, FILM COATED ORAL
COMMUNITY
Start: 2025-04-10

## 2025-05-05 RX ORDER — LIDOCAINE 50 MG/G
1 PATCH TOPICAL DAILY
Qty: 30 PATCH | Refills: 3 | Status: SHIPPED | OUTPATIENT
Start: 2025-05-05 | End: 2026-05-05

## 2025-05-05 RX ORDER — ATORVASTATIN CALCIUM 10 MG/1
TABLET, FILM COATED ORAL
COMMUNITY
Start: 2025-04-09 | End: 2025-05-08 | Stop reason: SDUPTHER

## 2025-05-05 ASSESSMENT — ENCOUNTER SYMPTOMS
VOMITING: 0
DIZZINESS: 0
DYSURIA: 0
NAUSEA: 0
FATIGUE: 0
SHORTNESS OF BREATH: 0
ADENOPATHY: 0
CHILLS: 0
CONSTIPATION: 0
SORE THROAT: 0
ARTHRALGIAS: 0
WEAKNESS: 0
WHEEZING: 0
COUGH: 0
UNEXPECTED WEIGHT CHANGE: 0
SLEEP DISTURBANCE: 0
DIARRHEA: 0
ABDOMINAL PAIN: 0
JOINT SWELLING: 0
PALPITATIONS: 0
CONFUSION: 0
CHEST TIGHTNESS: 0

## 2025-05-05 NOTE — PROGRESS NOTES
Discharge Facility: [facility]  Discharge Diagnosis: Appendicitis, s/p ileocectomy   Admission Date: 4/7/2025  Discharge Date: 5/2/2025    Pt was admitted to [facility] 3/26-4/7/2025.    PCP Appointment Date:  -5/5/2025 1100    Hospital Encounter and Summary Linked: Yes    Hospital Encounter     Pt has PCP appt within 2 business days of discharge. No outreach call made at this time.

## 2025-05-05 NOTE — PATIENT INSTRUCTIONS
Needs [facility] for VS monitoring, medication compliance and education. PT necessary for mobility assessment, gait training,improve strength and independence.  OT to assess safety and improve independence. Is home bound, requires an assistive device or an other person to leave home due to weakness and unsteady gait   Was nice seeing you today.  Continue same medication.  Have lab work done before next appointment if labs were ordered today.  Fu in 1 month.  Call/ contact our office with any concerns.    If you have labs or test done and you can't see the report in your chart or you didn't hear from us in 2 weeks after test/labs done , please, call our office for reports.  Please , do not assume that they were normal.    Any test results  and questions you might have , will be discussed at next visit -- please make sure to make a follow up appt after testing if reports are abnormal or you have questions.

## 2025-05-05 NOTE — PROGRESS NOTES
Subjective   Patient ID: Lindsay Hauser is a 89 y.o. female who presents for Hospital Follow-up.    Patient presents for Hospital Follow up for appendicitis and colon cancer, invasive  moderately differentiate adenocarcinoma.  Last A1C 7.6% on 12/23/24  Today A1C is 6.9%  After Surgery planning to setup Chemotherapy  Check with Dr. Berumen on this.  Is having nerve pain on legs, what type of pain medication can she use. Mostly thighs area and left thigh area.  For Diarrhea recently, what can she take for this prescription or OTC.  Not sure if she still needs Vitamin D3  Not sure if she should continue Methacarbamol  Alternative for Mucinex, this makes her cough, hospital gave Mucus Relief    Discharge Facility: [facility]  Discharge Diagnosis: Appendicitis, s/p ileocectomy   Admission Date: 4/7/2025  Discharge Date: 5/2/2025     Pt was admitted to [facility] 3/26-4/7/2025.    HOSPITAL COURSE:    presented to the ER with worsening abdominal pain, nausea, and leukocytosis. Imaging performed showed acute perforated appendicitis with the formation of an abscess. She was started on IV antibiotics and taken to the OR. Intra-operatively she was found to have an inflammatory mass at the junction of the appendix and cecum, therefore an ileocectomy was performed. Post operatively she was admitted to a regular nursing floor for further recovery. She was encouraged to ambulate to minimize her risk for developing a DVT, and to promote bowel function. Her sanderson was removed however she developed urinary retention requiring replacement of her sanderson catheter. Her recovery was complicated by the development of a post operative ileus which was treated with NG decompression and bowel rest. With the return of bowel function her diet was slowly advanced and her medications were converted to oral formulations. Prior to discharge she underwent a voiding trial and was able to void without  "complication. She was discharged to a Skilled Nursing Facility in stable condition on 4/7/2025.   She was dx with  stage IIB colon cancer at [facility] area, to start chemotx with  capecitabine, bid x 6 month,    DISCHARGE MEDICATION:    Medication List     START taking these medications     bisacodyl 10 mg Supp  Commonly known as: DULCOLAX  1 suppository by RECTAL route once daily as needed for constipation.    enoxaparin 40 mg/0.4 mL  Commonly known as: LOVENOX  Inject 0.4 mL subcutaneously once daily. For 30 days    lidocaine 4 % patch  Commonly known as: SALONPAS  Apply 1 patch as directed once daily for 5 days. APPLY TO: ABDOMEN - Remove patch after 12 hours.    oxyCODONE IR 5 mg immediate release tablet  Commonly known as: ROXICODONE  Take 0.5-1 tablets by mouth every 6 hours as needed for pain for up to 5 days.    polyethylene glycol 3350 17 gram packet  Take 1 packet by mouth once daily as needed for constipation. Dissolve dose in 4 - 8 ounces of liquid and take as directed.      CHANGE how you take these medications     insulin glargine 100 unit/mL (3 mL)  Inject 15 Units subcutaneously daily at bedtime. Monitor blood sugars closely and adjust your dose as indicated by your provider  What changed:   how much to take  when to take this  additional instructions      CONTINUE taking these medications     * albuterol HFA 90 mcg/actuation inhaler  Commonly known as: PROVENTIL HFA, VENTOLIN HFA    * albuterol HFA 90 mcg/actuation inhaler  Commonly known as: PROVENTIL HFA, VENTOLIN HFA  Inhale 2 Puffs as instructed every 4 hours as needed for wheezing/shortness of breath.    BD Ultrafine III Mini Pen 31 gauge x 3/16\"  Generic drug: Insulin Needles (Disposable)    calcium carbonate 600 mg-cholecalciferol 200 units 600 mg-5 mcg (200 unit) Tab    cholecalciferol 5,000 unit Tab  Commonly known as: VITAMIN D3    diclofenac 1 % topical gel  Commonly known as: VOLTAREN    esomeprazole 40 mg capsule  Commonly known as: " NexIUM    famotidine 20 mg tablet  Commonly known as: PEPCID    fluticasone 50 mcg/actuation nasal spray  Commonly known as: FLONASE    fluticasone-salmeterol 250-50 mcg/dose inhaler  Commonly known as: ADVAIR, WIXELA    lidocaine HCl 4 % Lqro    lisinopril 10 mg tablet  Commonly known as: ZESTRIL    metFORMIN 1,000 mg tablet  Commonly known as: GLUCOPHAGE    nystatin-triamcinolone ointment  Commonly known as: MYCOLOG    raNITIdine 150 mg tablet  Commonly known as: ZANTAC    simvastatin 20 mg tablet  Commonly known as: ZOCOR      * This list has 2 medication(s) that are the same as other medications prescribed for you. Read the directions carefully, and ask your doctor or other care provider to review them with you.   Had 2 colonoscopy in the past before 82 y old    On lantus  taking between 15 to 30 ui,  based on BS, lost wt,     Discussed insulin tx, she is not on short acting insuline so will start lantus at 15 ui in am , and increase with 2 ui if bs higher then 200 but only once a day.  To have ct  this Friday.  Has bilateral tigh pain , constant , using  lidoderm patch.      Review of Systems   Constitutional:  Negative for chills, fatigue and unexpected weight change.        Comment   HENT:  Negative for congestion, ear pain and sore throat.    Respiratory:  Negative for cough, chest tightness, shortness of breath and wheezing.    Cardiovascular:  Negative for palpitations and leg swelling.   Gastrointestinal:  Negative for abdominal pain, constipation, diarrhea, nausea and vomiting.   Genitourinary:  Negative for dysuria and urgency.   Musculoskeletal:  Negative for arthralgias and joint swelling.   Skin:  Negative for rash.   Neurological:  Negative for dizziness and weakness.   Hematological:  Negative for adenopathy.   Psychiatric/Behavioral:  Negative for confusion and sleep disturbance.    All other systems reviewed and are negative.      Objective   /58 (BP Location: Left arm, Patient Position:  "Sitting, BP Cuff Size: Adult)   Pulse 100   Temp 35.8 °C (96.4 °F) (Temporal)   Resp 16   Ht 1.549 m (5' 1\")   Wt 59.9 kg (132 lb)   SpO2 97%   BMI 24.94 kg/m²     Physical Exam  Constitutional:       Appearance: Normal appearance.      Comments: Weak, depressed, mid abd post sx scar   HENT:      Head: Normocephalic and atraumatic.   Eyes:      Pupils: Pupils are equal, round, and reactive to light.   Cardiovascular:      Rate and Rhythm: Normal rate and regular rhythm.   Pulmonary:      Effort: Pulmonary effort is normal.      Breath sounds: Normal breath sounds.   Musculoskeletal:         General: Normal range of motion.      Cervical back: Normal range of motion and neck supple.   Skin:     General: Skin is warm.   Neurological:      General: No focal deficit present.      Mental Status: She is alert and oriented to person, place, and time.   Psychiatric:         Mood and Affect: Mood normal.         Behavior: Behavior normal.         Assessment/Plan   Assessment & Plan  Type 2 diabetes mellitus with diabetic chronic kidney disease, unspecified CKD stage, unspecified whether long term insulin use    Orders:    POCT glycosylated hemoglobin (Hb A1C) manually resulted    Primary hypertension         Other emphysema (Multi)         Pain in both lower extremities  Possible neuropathy, radiculopathy?  Will start cymbolta    Orders:    XR lumbar spine 2-3 views; Future    DULoxetine (Cymbalta) 20 mg DR capsule; Take 2 capsules (40 mg) by mouth once daily. Do not crush or chew.    Acute bilateral low back pain with sciatica, sciatica laterality unspecified    Orders:    XR lumbar spine 2-3 views; Future    lidocaine (Lidoderm) 5 % patch; Place 1 patch over 12 hours on the skin once daily. Apply to painful area 12 hours per day, remove for 12 hours.    diclofenac sodium (Voltaren) 1 % gel; Apply 4.5 inches (4 g) topically 4 times a day.    DULoxetine (Cymbalta) 20 mg DR capsule; Take 2 capsules (40 mg) by mouth " once daily. Do not crush or chew.    Hospital discharge follow-up         Stage II adenocarcinoma of colon

## 2025-05-05 NOTE — ASSESSMENT & PLAN NOTE
Orders:    XR lumbar spine 2-3 views; Future    lidocaine (Lidoderm) 5 % patch; Place 1 patch over 12 hours on the skin once daily. Apply to painful area 12 hours per day, remove for 12 hours.    diclofenac sodium (Voltaren) 1 % gel; Apply 4.5 inches (4 g) topically 4 times a day.    DULoxetine (Cymbalta) 20 mg DR capsule; Take 2 capsules (40 mg) by mouth once daily. Do not crush or chew.

## 2025-05-05 NOTE — ASSESSMENT & PLAN NOTE
Possible neuropathy, radiculopathy?  Will start cymbolta    Orders:    XR lumbar spine 2-3 views; Future    DULoxetine (Cymbalta) 20 mg DR capsule; Take 2 capsules (40 mg) by mouth once daily. Do not crush or chew.

## 2025-05-06 ENCOUNTER — TELEPHONE (OUTPATIENT)
Dept: PRIMARY CARE | Facility: CLINIC | Age: 89
End: 2025-05-06
Payer: MEDICARE

## 2025-05-06 PROCEDURE — G0180 MD CERTIFICATION HHA PATIENT: HCPCS | Performed by: INTERNAL MEDICINE

## 2025-05-06 NOTE — TELEPHONE ENCOUNTER
Patient  daughter  called  stating  patient was  taking  simvastatin  ordered  by dr Berumen  before the  NH stay , but at  the  nursing home the dr  advised patient   to change  and start taking atorvastatin  instead  and  doctor ordered  RX  to [facility]  pharmacy  for atorvastatin, now  pharmacy  asked the  daughter/patient witch medication to send her  home, they have orders  for atorvastatin and  simvastatin     Please  advise.  Thank you  !

## 2025-05-07 ENCOUNTER — PATIENT OUTREACH (OUTPATIENT)
Dept: PRIMARY CARE | Facility: CLINIC | Age: 89
End: 2025-05-07
Payer: MEDICARE

## 2025-05-07 DIAGNOSIS — E78.5 DYSLIPIDEMIA: Primary | ICD-10-CM

## 2025-05-07 NOTE — PROGRESS NOTES
Unable to reach patient for follow up call after recent hospitalization.   Left voicemail with call back number for patient to call if needed

## 2025-05-08 ENCOUNTER — TELEPHONE (OUTPATIENT)
Dept: PRIMARY CARE | Facility: CLINIC | Age: 89
End: 2025-05-08
Payer: MEDICARE

## 2025-05-08 RX ORDER — ATORVASTATIN CALCIUM 10 MG/1
10 TABLET, FILM COATED ORAL DAILY
Qty: 90 TABLET | Refills: 3 | Status: SHIPPED | OUTPATIENT
Start: 2025-05-08

## 2025-05-09 ENCOUNTER — TELEPHONE (OUTPATIENT)
Dept: PRIMARY CARE | Facility: CLINIC | Age: 89
End: 2025-05-09
Payer: MEDICARE

## 2025-05-09 NOTE — TELEPHONE ENCOUNTER
Jude  from [facility]  left  message  they  did  the  PT evaluation for patient and  she will have PT  1 time/ week x 2 weeks  then 2 times / week  for 4 weeks, ,if   you have  any questions   to call at [phone]  Thank you !

## 2025-05-15 DIAGNOSIS — E11.9 TYPE 2 DIABETES MELLITUS WITHOUT RETINOPATHY (MULTI): Primary | ICD-10-CM

## 2025-05-15 RX ORDER — PEN NEEDLE, DIABETIC 31 GX5/16"
NEEDLE, DISPOSABLE MISCELLANEOUS
Qty: 100 EACH | Refills: 11 | Status: SHIPPED | OUTPATIENT
Start: 2025-05-15

## 2025-05-21 ENCOUNTER — TELEPHONE (OUTPATIENT)
Dept: PRIMARY CARE | Facility: CLINIC | Age: 89
End: 2025-05-21
Payer: MEDICARE

## 2025-05-21 DIAGNOSIS — E11.9 TYPE 2 DIABETES MELLITUS WITHOUT RETINOPATHY (MULTI): ICD-10-CM

## 2025-05-21 DIAGNOSIS — M54.40 ACUTE BILATERAL LOW BACK PAIN WITH SCIATICA, SCIATICA LATERALITY UNSPECIFIED: ICD-10-CM

## 2025-05-22 DIAGNOSIS — E11.9 TYPE 2 DIABETES MELLITUS WITHOUT RETINOPATHY (MULTI): ICD-10-CM

## 2025-05-22 RX ORDER — PEN NEEDLE, DIABETIC 30 GX3/16"
1 NEEDLE, DISPOSABLE MISCELLANEOUS
Qty: 100 EACH | Refills: 3 | OUTPATIENT
Start: 2025-05-22

## 2025-05-22 RX ORDER — BLOOD SUGAR DIAGNOSTIC
1 STRIP MISCELLANEOUS DAILY
Qty: 100 EACH | Refills: 3 | Status: SHIPPED | OUTPATIENT
Start: 2025-05-22

## 2025-05-30 ENCOUNTER — TELEPHONE (OUTPATIENT)
Dept: PRIMARY CARE | Facility: CLINIC | Age: 89
End: 2025-05-30
Payer: MEDICARE

## 2025-05-30 DIAGNOSIS — E11.9 TYPE 2 DIABETES MELLITUS WITHOUT RETINOPATHY (MULTI): ICD-10-CM

## 2025-05-30 NOTE — TELEPHONE ENCOUNTER
90 day RX for 100 Pen [place] Lorin's 5mm    Pharm [facility] will be faxing and approval patients daughter stated.      Please advise

## 2025-05-31 RX ORDER — INSULIN GLARGINE 100 [IU]/ML
INJECTION, SOLUTION SUBCUTANEOUS
Qty: 100 EACH | Refills: 3 | Status: SHIPPED | OUTPATIENT
Start: 2025-05-31

## 2025-06-02 ENCOUNTER — APPOINTMENT (OUTPATIENT)
Dept: PODIATRY | Facility: CLINIC | Age: 89
End: 2025-06-02
Payer: COMMERCIAL

## 2025-06-02 DIAGNOSIS — M79.675 PAIN IN TOES OF BOTH FEET: ICD-10-CM

## 2025-06-02 DIAGNOSIS — E11.9 TYPE 2 DIABETES MELLITUS WITHOUT RETINOPATHY (MULTI): Primary | ICD-10-CM

## 2025-06-02 DIAGNOSIS — M79.674 PAIN IN TOES OF BOTH FEET: ICD-10-CM

## 2025-06-02 DIAGNOSIS — B35.1 NAIL FUNGUS: ICD-10-CM

## 2025-06-02 PROCEDURE — 11721 DEBRIDE NAIL 6 OR MORE: CPT | Performed by: PODIATRIST

## 2025-06-02 RX ORDER — CAPECITABINE 500 MG/1
1000 TABLET, FILM COATED ORAL 2 TIMES DAILY
COMMUNITY
Start: 2025-05-16

## 2025-06-02 NOTE — PROGRESS NOTES
History Of Present Illness  Lindsay Hauser is a 89 y.o. female presenting for diabetic nail care. Patient complains with painful elongated nails.       PCP  Nilson Berumen MD  Last visit 5/5/25     Past Medical History  She has no past medical history on file.    Surgical History  She has a past surgical history that includes Other surgical history (09/09/2019) and Other surgical history (09/26/2022).     Social History  She reports that she quit smoking about 8 years ago. Her smoking use included cigarettes. She started smoking about 58 years ago. She has a 50 pack-year smoking history. She has never used smokeless tobacco. She reports that she does not currently use alcohol. She reports that she does not use drugs.    Family History  No family history on file.     Allergies  Acetaminophen, Penicillins, and Sulfa (sulfonamide antibiotics)    Medications  Current Outpatient Medications   Medication Sig Dispense Refill    capecitabine (Xeloda) 500 mg tablet Take 2 tablets (1,000 mg total) by mouth twice a day.      albuterol 90 mcg/actuation inhaler Inhale 2 puffs every 6 hours if needed for wheezing. 6.7 g 10    alendronate (Fosamax) 70 mg tablet Take 1 tablet (70 mg) by mouth every 7 days. Take in the morning with a full glass of water, on an empty stomach, and do not take anything else by mouth or lie down for the next 30 min. 4 tablet 11    Allergy Relief, fexofenadine, 180 mg tablet TAKE 1 TABLET BY MOUTH ONCE DAILY 30 tablet 10    atorvastatin (Lipitor) 10 mg tablet Take 1 tablet (10 mg) by mouth once daily. 90 tablet 3    calcium carbonate-vitamin D3 600 mg-5 mcg (200 unit) tablet Take 2 tablets by mouth once daily. 60 tablet 11    diclofenac sodium (Voltaren) 1 % gel Apply 4.5 inches (4 g) topically 4 times a day. 60 g 3    diclofenac sodium 1 % kit Place 4 inches on the skin 2 times a day. 1 each 3    DULoxetine (Cymbalta) 20 mg DR capsule Take 2 capsules (40 mg) by mouth once daily. Do not crush or chew. 60  "capsule 5    esomeprazole (NexIUM) 40 mg DR capsule Take by mouth.      fluticasone (Flonase) 50 mcg/actuation nasal spray Administer 1 spray into each nostril once daily. 9.9 mL 11    fluticasone propion-salmeteroL (Advair Diskus) 250-50 mcg/dose diskus inhaler INHALE 1 PUFF BY MOUTH EVERY 12 HOURS 60 each 11    insulin glargine (Lantus Solostar U-100 Insulin) 100 unit/mL (3 mL) pen  100 each 3    lidocaine (Lidoderm) 5 % patch Place 1 patch over 12 hours on the skin once daily. Apply to painful area 12 hours per day, remove for 12 hours. 30 patch 3    lisinopril 10 mg tablet TAKE 1 TABLET BY MOUTH ONCE EVERY 24 HOURS 30 tablet 10    metFORMIN (Glucophage) 1,000 mg tablet TAKE 1 TABLET BY MOUTH TWICE DAILY WITH MEALS 60 tablet 10    methocarbamol (Robaxin) 500 mg tablet Take by mouth.      Mucus Relief  mg 12 hr tablet TAKE 2 TABLETS BY MOUTH TWICE DAILY *DO NOT CRUSH, CHEW OR SPLIT* 60 tablet 10    nystatin-triamcinolone (Mycolog II) ointment APPLY TOPICALLY THREE TIMES A DAY 60 g 10    OneTouch Verio test strips strip USE TO TEST BLOOD SUGAR TWICE DAILY 100 strip 10    pen needle, diabetic (Novofine 32) 32 gauge x 1/4\" needle 1 each by abdominal subcutaneous route once daily. 100 each 3    polyethylene glycol (Glycolax, Miralax) 17 gram packet Take 17 g by mouth.       No current facility-administered medications for this visit.       Review of Systems    REVIEW OF SYSTEMS  GENERAL:  Negative for malaise, significant weight loss, fever  CARDIOVASCULAR: leg swelling   MUSCULOSKELETAL:  Negative for joint pain or swelling, back pain, and muscle pain.  SKIN:  Negative for lesions, rash, and itching  PSYCH:  Negative for sleep disturbance, mood disorder and recent psychosocial stressors  NEURO: Negative, denies any burning, tingling or numbness     Objective: frail, ca dx   Vasc: DP and PT pulses are palpable bilateral.  CFT is less than 3 seconds bilateral.  Skin temperature is warm to cool proximal to distal " bilateral.      Neuro:  Light touch is intact to the foot bilateral.  No clonus     Derm: Nails 1-5 bilateral are thickened, elongated and crumbly with subungual debris. Ingrown first bilateral. Skin is supple with normal texture and turgor noted.  Webspaces are clean, dry and intact bilateral.    Ortho: Muscle strength is 5/5 for all pedal groups tested.  Ankle joint, subtalar joint, 1st MPJ and lesser MPJ ROM is full and without pain or crepitus.  The foot type is rectus bilateral off weight bearing.  There are no structural deformities noted.    Assessment/Plan     DM  Painful nail mycosis    Toenails are debrided in length and thickness to avoid infection and for pain relief

## 2025-06-04 ENCOUNTER — APPOINTMENT (OUTPATIENT)
Dept: PRIMARY CARE | Facility: CLINIC | Age: 89
End: 2025-06-04
Payer: COMMERCIAL

## 2025-06-04 VITALS
SYSTOLIC BLOOD PRESSURE: 110 MMHG | OXYGEN SATURATION: 93 % | HEART RATE: 98 BPM | DIASTOLIC BLOOD PRESSURE: 60 MMHG | BODY MASS INDEX: 24.43 KG/M2 | WEIGHT: 129.4 LBS | HEIGHT: 61 IN | TEMPERATURE: 97.2 F | RESPIRATION RATE: 18 BRPM

## 2025-06-04 DIAGNOSIS — Z23 IMMUNIZATION DUE: ICD-10-CM

## 2025-06-04 DIAGNOSIS — E11.9 TYPE 2 DIABETES MELLITUS WITHOUT RETINOPATHY (MULTI): ICD-10-CM

## 2025-06-04 DIAGNOSIS — Z00.00 MEDICARE ANNUAL WELLNESS VISIT, SUBSEQUENT: Primary | ICD-10-CM

## 2025-06-04 DIAGNOSIS — I10 PRIMARY HYPERTENSION: ICD-10-CM

## 2025-06-04 DIAGNOSIS — Z99.81 DEPENDENCE ON SUPPLEMENTAL OXYGEN: ICD-10-CM

## 2025-06-04 DIAGNOSIS — E78.49 OTHER HYPERLIPIDEMIA: ICD-10-CM

## 2025-06-04 DIAGNOSIS — C18.9: ICD-10-CM

## 2025-06-04 DIAGNOSIS — J43.8 OTHER EMPHYSEMA (MULTI): ICD-10-CM

## 2025-06-04 DIAGNOSIS — E78.5 DYSLIPIDEMIA: ICD-10-CM

## 2025-06-04 PROBLEM — R26.2 DIFFICULTY WALKING: Status: ACTIVE | Noted: 2025-04-07

## 2025-06-04 PROBLEM — I11.9 HYPERTENSIVE HEART DISEASE WITHOUT CONGESTIVE HEART FAILURE: Status: ACTIVE | Noted: 2025-04-07

## 2025-06-04 PROBLEM — E11.3291: Status: ACTIVE | Noted: 2025-04-07

## 2025-06-04 PROBLEM — F17.200 NICOTINE DEPENDENCE: Status: ACTIVE | Noted: 2025-04-07

## 2025-06-04 PROBLEM — J96.10 CHRONIC RESPIRATORY FAILURE: Status: ACTIVE | Noted: 2025-04-07

## 2025-06-04 PROBLEM — H52.13 MYOPIA, BILATERAL: Status: ACTIVE | Noted: 2024-09-28

## 2025-06-04 PROBLEM — Z90.89 ACQUIRED ABSENCE OF ORGAN: Status: ACTIVE | Noted: 2025-04-07

## 2025-06-04 PROBLEM — R10.9 ABDOMINAL PAIN: Status: ACTIVE | Noted: 2025-04-07

## 2025-06-04 PROBLEM — E44.1 MALNUTRITION OF MILD DEGREE (MULTI): Status: ACTIVE | Noted: 2025-04-04

## 2025-06-04 PROBLEM — Z90.49 S/P RIGHT COLECTOMY: Status: ACTIVE | Noted: 2025-04-05

## 2025-06-04 PROBLEM — H52.4 PRESBYOPIA: Status: ACTIVE | Noted: 2024-09-28

## 2025-06-04 PROBLEM — H35.369 DRUSEN STAGE MACULAR DEGENERATION: Status: ACTIVE | Noted: 2025-04-07

## 2025-06-04 PROBLEM — H52.203 ASTIGMATISM OF BOTH EYES: Status: ACTIVE | Noted: 2024-09-28

## 2025-06-04 PROBLEM — A41.9 SEPSIS (MULTI): Status: ACTIVE | Noted: 2025-04-07

## 2025-06-04 PROBLEM — H43.819 VITREOUS DEGENERATION: Status: ACTIVE | Noted: 2025-04-07

## 2025-06-04 PROCEDURE — 3078F DIAST BP <80 MM HG: CPT | Performed by: INTERNAL MEDICINE

## 2025-06-04 PROCEDURE — 1036F TOBACCO NON-USER: CPT | Performed by: INTERNAL MEDICINE

## 2025-06-04 PROCEDURE — 1158F ADVNC CARE PLAN TLK DOCD: CPT | Performed by: INTERNAL MEDICINE

## 2025-06-04 PROCEDURE — G0009 ADMIN PNEUMOCOCCAL VACCINE: HCPCS | Performed by: INTERNAL MEDICINE

## 2025-06-04 PROCEDURE — 90677 PCV20 VACCINE IM: CPT | Performed by: INTERNAL MEDICINE

## 2025-06-04 PROCEDURE — 1160F RVW MEDS BY RX/DR IN RCRD: CPT | Performed by: INTERNAL MEDICINE

## 2025-06-04 PROCEDURE — 3074F SYST BP LT 130 MM HG: CPT | Performed by: INTERNAL MEDICINE

## 2025-06-04 PROCEDURE — 1170F FXNL STATUS ASSESSED: CPT | Performed by: INTERNAL MEDICINE

## 2025-06-04 PROCEDURE — 1159F MED LIST DOCD IN RCRD: CPT | Performed by: INTERNAL MEDICINE

## 2025-06-04 PROCEDURE — G0439 PPPS, SUBSEQ VISIT: HCPCS | Performed by: INTERNAL MEDICINE

## 2025-06-04 ASSESSMENT — ACTIVITIES OF DAILY LIVING (ADL)
MANAGING_FINANCES: NEEDS ASSISTANCE
TAKING_MEDICATION: INDEPENDENT
GROCERY_SHOPPING: NEEDS ASSISTANCE
BATHING: INDEPENDENT
DOING_HOUSEWORK: INDEPENDENT
DRESSING: INDEPENDENT

## 2025-06-04 ASSESSMENT — ENCOUNTER SYMPTOMS
ARTHRALGIAS: 0
CHEST TIGHTNESS: 0
SLEEP DISTURBANCE: 1
DYSURIA: 0
CONSTIPATION: 0
HEADACHES: 1
FATIGUE: 1
SHORTNESS OF BREATH: 1
BRUISES/BLEEDS EASILY: 1
NAUSEA: 0
WHEEZING: 1
CHILLS: 0
DIZZINESS: 1
JOINT SWELLING: 0
ADENOPATHY: 0
UNEXPECTED WEIGHT CHANGE: 0
SORE THROAT: 0
DIARRHEA: 0
PALPITATIONS: 0
VOMITING: 0
ABDOMINAL PAIN: 1
CONFUSION: 0
WEAKNESS: 0
COUGH: 1

## 2025-06-04 ASSESSMENT — PATIENT HEALTH QUESTIONNAIRE - PHQ9
1. LITTLE INTEREST OR PLEASURE IN DOING THINGS: SEVERAL DAYS
SUM OF ALL RESPONSES TO PHQ9 QUESTIONS 1 AND 2: 2
2. FEELING DOWN, DEPRESSED OR HOPELESS: SEVERAL DAYS

## 2025-06-04 NOTE — PROGRESS NOTES
Subjective   Reason for Visit: Lindsay Hauser is an 89 y.o. female here for a Medicare Wellness visit.     Past Medical, Surgical, and Family History reviewed and updated in chart.    Reviewed all medications by prescribing practitioner or clinical pharmacist (such as prescriptions, OTCs, herbal therapies and supplements) and documented in the medical record.    AWV  Former  smoker [id number]  Labs - 3.2025,  5.2025  -ordered by dr Matilda PITTMAN  Xray  lumbar- 5.2025  DEXA-6.29.2024  CT chest  done at  [facility]  on 5.9.2025  ordered by dr Matilda Pfeiffer, amandeep  next week 6.13.2025 and more blood work will be done    Mammogram  -long time  ago, patient  decline to  have  it  done    Colonoscopy -patient  decline today    Halley- daughter in the  room today  with  patient    BG  today was 135  Using oxygen at  home PRN- 2L    Chemotherapy  2tabs  BID , for colon cancer   Has HHC   weekly   1RN  and 1 PT  in home ,   Has copd , is on o2 nc, has hypoxia some times and increased cough, will refer to pulmonary and pharmacy for copd          Patient Care Team:  Nilson Berumen MD as PCP - General  Nilson Berumen MD as PCP - [facility] PCP  Concepción Cabrera RN as Care Manager (Case Management)     Review of Systems   Constitutional:  Positive for fatigue. Negative for chills and unexpected weight change.        Comment   HENT:  Negative for congestion, ear pain and sore throat.    Respiratory:  Positive for cough, shortness of breath and wheezing. Negative for chest tightness.         Sometimes    Cardiovascular:  Positive for leg swelling. Negative for palpitations.        L  foot    Gastrointestinal:  Positive for abdominal pain. Negative for constipation, diarrhea, nausea and vomiting.   Genitourinary:  Negative for dysuria and urgency.   Musculoskeletal:  Negative for arthralgias and joint swelling.   Skin:  Negative for rash.   Neurological:  Positive for dizziness and headaches. Negative for weakness.         "Sometimes    Hematological:  Negative for adenopathy. Bruises/bleeds easily.   Psychiatric/Behavioral:  Positive for sleep disturbance. Negative for confusion.         Due  to sweating a lot  side effect from medication    All other systems reviewed and are negative.      Objective   Vitals:  /60 (BP Location: Left arm, Patient Position: Sitting)   Pulse 98   Temp 36.2 °C (97.2 °F)   Resp 18   Ht 1.549 m (5' 1\")   Wt 58.7 kg (129 lb 6.4 oz)   SpO2 93%   BMI 24.45 kg/m²       Physical Exam  Constitutional:       Appearance: Normal appearance.   HENT:      Head: Normocephalic and atraumatic.   Eyes:      Pupils: Pupils are equal, round, and reactive to light.   Cardiovascular:      Rate and Rhythm: Normal rate and regular rhythm.   Pulmonary:      Effort: Pulmonary effort is normal.      Breath sounds: Normal breath sounds.   Musculoskeletal:         General: Normal range of motion.      Cervical back: Normal range of motion and neck supple.   Skin:     General: Skin is warm.   Neurological:      General: No focal deficit present.      Mental Status: She is alert and oriented to person, place, and time.   Psychiatric:         Mood and Affect: Mood normal.         Behavior: Behavior normal.       Lab Results   Component Value Date    WBC 10.8 03/24/2025    HGB 13.7 03/24/2025    HCT 43.8 03/24/2025    MCV 88.7 03/24/2025     03/24/2025     Lab Results   Component Value Date    GLUCOSE 194 (H) 03/24/2025    CALCIUM 9.4 03/24/2025     03/24/2025    K 4.8 03/24/2025    CO2 31 03/24/2025    CL 97 (L) 03/24/2025    BUN 14 03/24/2025    CREATININE 0.59 (L) 03/24/2025       Assessment & Plan  Medicare annual wellness visit, subsequent         Stage II adenocarcinoma of colon         Other emphysema (Multi)  Had pfts in 2020, severe copd  On advir ,   On o2 nc.  Will refer to pulmonary.    Orders:    Referral to Clinical Pharmacy; Future    Referral to Pulmonology; Future    Dependence on supplemental " oxygen    Orders:    Referral to Clinical Pharmacy; Future    Referral to Pulmonology; Future    Dyslipidemia         Primary hypertension         Other hyperlipidemia         Type 2 diabetes mellitus without retinopathy (Multi)         Immunization due    Orders:    Pneumococcal conjugate vaccine, 20-valent (PREVNAR 20)

## 2025-06-04 NOTE — ASSESSMENT & PLAN NOTE
Had pfts in 2020, severe copd  On advir ,   On o2 nc.  Will refer to pulmonary.    Orders:    Referral to Clinical Pharmacy; Future    Referral to Pulmonology; Future

## 2025-06-04 NOTE — PATIENT INSTRUCTIONS
Was nice seeing you today.  Continue same medication.  Have lab work done before next appointment if labs were ordered today.  Fu in 2 month.  Call/ contact our office with any concerns.    If you have labs or test done and you can't see the report in your chart or you didn't hear from us in 2 weeks after test/labs done , please, call our office for reports.  Please , do not assume that they were normal.    Any test results  and questions you might have , will be discussed at next visit -- please make sure to make a follow up appt after testing if reports are abnormal or you have questions.

## 2025-06-10 ENCOUNTER — APPOINTMENT (OUTPATIENT)
Dept: PHARMACY | Facility: HOSPITAL | Age: 89
End: 2025-06-10
Payer: MEDICARE

## 2025-06-10 ENCOUNTER — PATIENT OUTREACH (OUTPATIENT)
Dept: PRIMARY CARE | Facility: CLINIC | Age: 89
End: 2025-06-10

## 2025-06-10 DIAGNOSIS — Z99.81 DEPENDENCE ON SUPPLEMENTAL OXYGEN: ICD-10-CM

## 2025-06-10 DIAGNOSIS — J43.8 OTHER EMPHYSEMA (MULTI): ICD-10-CM

## 2025-06-10 RX ORDER — TIOTROPIUM BROMIDE 18 UG/1
1 CAPSULE ORAL; RESPIRATORY (INHALATION)
Qty: 30 CAPSULE | Refills: 0 | Status: SHIPPED | OUTPATIENT
Start: 2025-06-10 | End: 2025-07-10

## 2025-06-10 NOTE — PROGRESS NOTES
Clinical Pharmacy Appointment    Patient ID: Lindsay Hauser is a 89 y.o. female who presents for COPD.    Pt is here for First appointment.     Referring Provider: Nilson Berumen MD  PCP: Nilson Berumen MD  Last visit with PCP: 6/4/2025   Next visit with PCP: 8/11/2025    Subjective   Medication Reconciliation:  Changed: N/A  Added: Spiriva handihaler- inhale 1 capsule daily  Discontinued: N/A    Drug Interactions  No relevant drug interactions were noted.    Medication System Management  Patient's preferred pharmacy: [facility]   Adherence/Organization: uses a pill box  Affordability/Accessibility: no issues reported, however did receive a bill from the pharmacy for vitamins/diclofenac gel      Interval History  Patient on chemotherapy for colon cancer    HPI  PULMONARY ASSESSMENT  Patient has been diagnosed with: Emphysema  Does patient see pulmonology: No- however has first appointment scheduled for 7/2/2025  has not had PFT's completed in last 2 years    Current Regimen  Albuterol 90 mcg/actuation inhaler- 2 puffs every 6 hours as needed  Advair Diskus 250-50 mcg/dose inhaler- 1 puff every 12 hours  O2 nasal cannula    Clarifications to above regimen: none    Adverse Effects: none    Appropriate technique? N/A    Historical Treatment  None     Symptom Management  Current symptoms: dyspnea, cough, fatigue, and losing voice  Triggers: allergies, exertion  Alleviating factors: inhalers, resting, oxygen   Exercise capacity: 5 minute leg strengthening exercises, balancing exercises twice a day, limited d/t dyspnea  How often do you use your rescue inhaler? Every 6 hours  mMRC score: 3    Exacerbation Hx  When was your last hospitalization for an exacerbation? 7 years ago for bronchitis/pneumonia  When was the last time you were treated with antibiotics and/or steroids? Had antibiotics 2 months ago, last had steroids in fall 2024   Total number of hospitalizations d/t exacerbation: 1    Immunization  "History:  Influenza: Date [9/26/2022]  PCV13: Date [5/16/2016]  PPSV23: Date [10/2/2013]  PCV20: Date [6/4/2025]  COVID: Date [6/22/2021]  RSV: Date [DUE]    Smoking History  She quit smoking approximately 8 years ago (2017).    Objective   Allergies[1]  Social History     Social History Narrative    Not on file      Medication Review  Current Outpatient Medications   Medication Instructions    albuterol 90 mcg/actuation inhaler 2 puffs, inhalation, Every 6 hours PRN    alendronate (FOSAMAX) 70 mg, oral, Every 7 days, Take in the morning with a full glass of water, on an empty stomach, and do not take anything else by mouth or lie down for the next 30 min.    atorvastatin (LIPITOR) 10 mg, oral, Daily    calcium carbonate-vitamin D3 600 mg-5 mcg (200 unit) tablet 2 tablets, oral, Daily    capecitabine (XELODA) 1,000 mg, 2 times daily    DULoxetine (CYMBALTA) 40 mg, oral, Daily, Do not crush or chew.    fexofenadine (ALLEGRA) 180 mg, oral, Daily    fluticasone (Flonase) 50 mcg/actuation nasal spray 1 spray, Each Nostril, Daily RT    fluticasone propion-salmeteroL (Advair Diskus) 250-50 mcg/dose diskus inhaler 1 puff, inhalation, Every 12 hours    insulin glargine (Lantus Solostar U-100 Insulin) 100 unit/mL (3 mL) pen No dose, route, or frequency recorded.    lidocaine (Lidoderm) 5 % patch 1 patch, transdermal, Daily, Apply to painful area 12 hours per day, remove for 12 hours.    lisinopril 10 mg tablet TAKE 1 TABLET BY MOUTH ONCE EVERY 24 HOURS    metFORMIN (GLUCOPHAGE) 1,000 mg, oral, 2 times daily (morning and late afternoon)    OneTouch Verio test strips strip USE TO TEST BLOOD SUGAR TWICE DAILY    pen needle, diabetic (Novofine 32) 32 gauge x 1/4\" needle 1 each, abdominal subcutaneous, Daily    tiotropium (SPIRIVA) 18 mcg, inhalation, Daily RT      Vitals  BP Readings from Last 2 Encounters:   06/04/25 110/60   05/05/25 116/58     BMI Readings from Last 1 Encounters:   06/04/25 24.45 kg/m²      Labs  A1C  Lab " Results   Component Value Date    HGBA1C 6.9 (A) 05/05/2025    HGBA1C 7.6 (A) 12/23/2024    HGBA1C 8.1 (A) 09/16/2024     BMP  Lab Results   Component Value Date    CALCIUM 9.4 03/24/2025     03/24/2025    K 4.8 03/24/2025    CO2 31 03/24/2025    CL 97 (L) 03/24/2025    BUN 14 03/24/2025    CREATININE 0.59 (L) 03/24/2025    EGFR 87 03/24/2025     LFTs  Lab Results   Component Value Date    ALT 9 03/24/2025    AST 14 03/24/2025    ALKPHOS 63 03/24/2025    BILITOT 0.5 03/24/2025     FLP  Lab Results   Component Value Date    TRIG 108 03/24/2025    CHOL 170 03/24/2025    LDLF 82 12/30/2022    LDLCALC 95 03/24/2025    HDL 55 03/24/2025     Urine Microalbumin  Lab Results   Component Value Date    MICROALBCREA 16 03/24/2025     Weight Management  Wt Readings from Last 3 Encounters:   06/04/25 58.7 kg (129 lb 6.4 oz)   05/05/25 59.9 kg (132 lb)   12/23/24 64.5 kg (142 lb 3.2 oz)      There is no height or weight on file to calculate BMI.     Assessment/Plan   Problem List Items Addressed This Visit       COPD (chronic obstructive pulmonary disease) (Multi)    Patient with COPD that is stable and poorly controlled. Based on CAT score, exacerbation history, and eosinophil count, patient is GOLD Group E and LAMA+LABA therapy is recommended. Patient is currently using an Advair Diskus inhaler and albuterol inhaler as needed. Patient also has a nasal cannula for oxygen at home. Patient's COPD symptoms include shortness of breath, cough, fatigue, and occasionally losing her voice. Symptoms are typically triggered by allergies and exertion and are alleviated by inhaler use, oxygen, and rest. Patient does leg strengthening and balancing exercises a couple of times per day for exercise. As patient currently does not use a LAMA inhaler, will initiate Spiriva handihaler to hopefully help with reducing patient's COPD symptoms. Counseled patient and patient's daughter on use of inhaler. Patient has an appointment scheduled with  pulmonology in the beginning of July. May consider switching to an ICS+LAMA+LABA inhaler in the future. Follow-up in 4 weeks.     Medication Changes:  CONTINUE  Albuterol 90 mcg/actuation inhaler- 2 puffs every 6 hours as needed  Advair Diskus 250-50 mcg/dose inhaler- 1 puff every 12 hours  O2 nasal cannula  START  Spiriva handihaler- inhale contents of 1 capsule daily    Future Considerations:  Consider switching to Breztri Aerosphere if needed    Monitoring and Education:  Counseled patient on importance of using maintenance inhalers daily  Counseled patient on how to use Spiriva handihaler         Relevant Medications    tiotropium (Spiriva) 18 mcg inhalation capsule    Other Relevant Orders    Referral to Clinical Pharmacy    Dependence on supplemental oxygen    Relevant Medications    tiotropium (Spiriva) 18 mcg inhalation capsule    Other Relevant Orders    Referral to Clinical Pharmacy     Clinical Pharmacist follow-up: 7/9/25 @ 1:00 PM, Telehealth visit  Patient is not followed in [facility].     Continue all meds under the continuation of care with the referring provider and clinical pharmacy team.    Thank you,  Emma Sanchez, PharmD  Clinical Pharmacist  [phone]    Verbal consent to manage patient's drug therapy was obtained from the patient. They were informed they may decline to participate or withdraw from participation in pharmacy services at any time.         [1]   Allergies  Allergen Reactions    Acetaminophen Unknown    Penicillins Other    Sulfa (Sulfonamide Antibiotics) Rash

## 2025-06-10 NOTE — PROGRESS NOTES
Successful outreach to patient regarding hospitalization as patient continues TCM program. Spoke with pt daughter Halley who reports pt is doing really well.  Halley reports pt started chemo two weeks ago.  Pt last PCP appt 6/4/2025. Halley denies any current needs from PCP.   She reports pt has all of her prescriptions and everything she needs.     Verified upcoming appts;  -6/10/2025 1300 virtual pharmacy  -7/2/2025 0920 pulmonology  -8/4/2025 1310 podiatry nail trim  -8/11/2025 1540 PCP; Halley aware of lab work    Halley denies any questions, needs, or concerns at this time. She was given call back number and encouraged to call if questions or needs arise.

## 2025-06-10 NOTE — ASSESSMENT & PLAN NOTE
Patient with COPD that is stable and poorly controlled. Based on CAT score, exacerbation history, and eosinophil count, patient is GOLD Group E and LAMA+LABA therapy is recommended. Patient is currently using an Advair Diskus inhaler and albuterol inhaler as needed. Patient also has a nasal cannula for oxygen at home. Patient's COPD symptoms include shortness of breath, cough, fatigue, and occasionally losing her voice. Symptoms are typically triggered by allergies and exertion and are alleviated by inhaler use, oxygen, and rest. Patient does leg strengthening and balancing exercises a couple of times per day for exercise. As patient currently does not use a LAMA inhaler, will initiate Spiriva handihaler to hopefully help with reducing patient's COPD symptoms. Counseled patient and patient's daughter on use of inhaler. Patient has an appointment scheduled with pulmonology in the beginning of July. May consider switching to an ICS+LAMA+LABA inhaler in the future. Follow-up in 4 weeks.     Medication Changes:  CONTINUE  Albuterol 90 mcg/actuation inhaler- 2 puffs every 6 hours as needed  Advair Diskus 250-50 mcg/dose inhaler- 1 puff every 12 hours  O2 nasal cannula  START  Spiriva handihaler- inhale contents of 1 capsule daily    Future Considerations:  Consider switching to Breztri Aerosphere if needed    Monitoring and Education:  Counseled patient on importance of using maintenance inhalers daily  Counseled patient on how to use Spiriva handihaler

## 2025-06-24 NOTE — PROGRESS NOTES
Patient: Lindsay Hauser    [id number]  : 1935 -- AGE 89 y.o.    Provider: Jerrica HUIZAR- CNP     [place] [facility]   [place] Date: 25            [facility]  New Visit Note      HISTORY OF PRESENT ILLNESS     The patient's referring provider is: Nilson Berumen MD    HISTORY OF PRESENT ILLNESS   Lindsay Hauser is a 89 y.o. female with a history of colon cancer on oral chemo, emphysema/severe COPD, hypertension, hyperlipidemia, DM2, osteopenia who is a former smoker (60 pack years), who presents to a [facility] for an initial evaluation for COPD/emphysema management an shortness of breath.     I have independently interviewed and examined the patient in the office and reviewed available records.    Current History    On today's visit, the patient is accompanied by her daughter. Reports over the past few months she has felt her shortness of breath progressing. Worse with heat, humidity and long distances. She has a cough and wheeze as well.  She had appendicitis s/p appendectomy and colon cancer tumor removed in April, discharged to NH with oxygen. Was wearing 3L with activity and and 2L with sleep. Returned home a month and half ago.     Daughter reports her oxygen concentrator is very old and DME company is out of buisness.  She uses advair daily and was to start sprivia a month ago but has not received yet.  Uses albuterol 4 times a day.    6MWT in office today: She started at 90% on room air at rest. After walkign for 2 min 40 seconds she desaturated to 87% on room air, 3L of oxygen was applied and she recovered to 94% on 3L oxygen.    She is on oral chemo for colon cancer since April.    Previous pulmonary history: COPD/emphysema    Inhalers/nebulized medications: Advair Spiriva and albuterol    Hospitalization History: He has not been hospitalized over the last year for breathing related problem.    Sleep  history: Denies snoring, apnea, feeling tired during the day or taking naps during the day.       ALLERGIES AND MEDICATIONS     ALLERGIES  Allergies[1]    MEDICATIONS  Current Medications[2]      PAST HISTORY     PAST MEDICAL HISTORY  Colon cancer on Xeloda  Hypertension  Hyperlipidemia  COPD/emphysema  DM2  Osteopenia    PAST SURGICAL HISTORY  Surgical History[3]    IMMUNIZATION HISTORY  Immunization History   Administered Date(s) Administered    COVID-19, mRNA, LNP-S, PF, 30 mcg/0.3 mL dose 2021, 2021    Flu vaccine, quadrivalent, high-dose, preservative free, age 65y+ (FLUZONE) 2020, 2022    Flu vaccine, trivalent, preservative free, HIGH-DOSE, age 65y+ (Fluzone) 10/06/2015, 2019    Influenza, seasonal, injectable 2020    Pneumococcal conjugate vaccine, 13-valent (PREVNAR 13) 2016    Pneumococcal conjugate vaccine, 20-valent (PREVNAR 20) 2025    Pneumococcal polysaccharide vaccine, 23-valent, age 2 years and older (PNEUMOVAX 23) 2001, 10/02/2013    Td (adult), unspecified 2001    Td vaccine, age 7 years and older (TDVAX) 2008    Zoster, live 2008       SOCIAL HISTORY  Tobacco Smokin- 83 y/o - 1 ppd - 60 pack years  Smokeless Tobacco/Vaping: none  Illicit drugs: denies  Alcohol consumption: none  Pets: none  Living situation: lives at home alone    OCCUPATIONAL/ENVIRONMENTAL HISTORY  Occupation: Furrier  No known exposure to asbestos, silica, beryllium or inhaled metals.  No exposure to birds or exotic animals.    FAMILY HISTORY  Family History[4]  No family history of pulmonary disease.  Sister - cervical cancer  Sister - Melanoma skin cancer  no family history of autoimmune disorders.    REVIEW OF SYSTEMS     REVIEW OF SYSTEMS  Review of Systems    Constitutional: No fever, no chills, no night sweats.    Eyes: No double vision, no floaters, no dry eyes.   ENT: See HPI.   Neck: No neck stiffness.  Cardiovascular: No sharp chest pain, no  heart racing, no leg swelling.  Respiratory: as noted in HPI.   Gastrointestinal: No nausea, no vomiting, no diarrhea.   Musculoskeletal: No joint pain, no back pain.   Integumentary: No rashes or sores.  Neurological: No dizziness, no headaches. Sleeping well.  Psychiatric: No mood changes.   Endocrine: No hot flashes, no cold intolerance, weight is stable.  Hematologic: No easy bruising or bleeding.    PHYSICAL EXAM     VITAL SIGNS:   Vitals:    07/02/25 0926   BP: 129/73   Pulse: 83   Temp: 36.8 °C (98.2 °F)   SpO2: 91%          CURRENT WEIGHT: Body mass index is 24 kg/m².    PREVIOUS WEIGHTS:  Wt Readings from Last 3 Encounters:   06/04/25 58.7 kg (129 lb 6.4 oz)   05/05/25 59.9 kg (132 lb)   12/23/24 64.5 kg (142 lb 3.2 oz)       Physical Exam    Constitutional: General appearance: Alert and oriented.  No acute distress. Well developed, well nourished.  Head and face: Symmetric  ENT: external inspection of ear and nose normal. No intranasal polyps. No oropharyngeal exudates.    Oropharynx: normal   Neck: supple, no lymphadenopathy  Pulmonary: Chest is normal. No increased work of breathing or signs of respiratory distress. Clear to auscultation bilaterally - no crackles, wheezing, or rhonchi.   Cardiovascular: Heart rate and rhythm normal. Normal S1, S2 - no murmurs, gallops, or pericardial rub.   Abdomen: Soft, non tender, +BS  Extremities: No edema. No clubbing or cyanosis of the fingernails.    Neurologic: Moves all four extremities   MSK: Normal movements of extremities. Gait normal   Psychiatric: Intact judgement and insight.    RESULTS/DATA     Pulmonary Function Test Results         Pulmonary - Diagnostics Testing - Scan on 8/20/2021              Chest Radiograph     XR chest 1 view 04/07/2025    Narrative  * * *Final Report* * *    DATE OF EXAM: Apr 7 2025  1:13AM    FVX   5376  -  XR CHEST 1V FRONTAL PORT  / ACCESSION #  [id number]    PROCEDURE REASON: Shortness of breath    * * * * Physician  Interpretation * * * *    EXAMINATION:  CHEST RADIOGRAPH (PORTABLE SINGLE VIEW AP)    Exam Date/Time:  4/7/2025 1:13 AM  CLINICAL HISTORY: Shortness of breath  MQ:  XCPR_5  Comparison:  03/27/2025    RESULT:    Lines, tubes, and devices:  None.    Lungs and pleura:  Small bilateral pleural effusions.  Mild LEFT basilar  atelectasis.    Cardiomediastinal silhouette:  Stable cardiomediastinal silhouette.    Other:  .    Impression  IMPRESSION:    See result.    : ISABEL  Transcribe Date/Time: Apr 7 2025  4:25A    Dictated by : IZZY VELAZQUEZ MD    This examination was interpreted and the report reviewed and  electronically signed by:  IZZY VELAZQUEZ MD on Apr 7 2025  4:25AM  EST      Chest CT Scan       [facility]  Outside Information  Results  CT chest w IV contrast (Order [id number])     CT chest w IV contrast   5/9/2025  Order: [id number]  Impression    IMPRESSION: No suspicious lung nodules or adenopathy identified    : [facility]    Transcribe Date/Time: May 15 2025  9:44A    Dictated by : DYLAN CARD MD    This examination was interpreted and the report reviewed and  electronically signed by:  DYLAN CARD MD on May 15 2025  9:55AM  EST  Narrative    * * *Final Report* * *    DATE OF EXAM: May  9 2025  8:52AM      FVC   0539  -  CT CHEST W IVCON  / ACCESSION #  [id number]    PROCEDURE REASON: multiple diagnoses        * * * * Physician Interpretation * * * *     EXAMINATION:  CHEST CT WITH CONTRAST    CLINICAL HISTORY: Cecal neoplasm    Technique:  Spiral CT acquisition of the chest from the thoracic inlet to  the upper abdomen following IV contrast.  MQ:  CTCW_6  Contrast:  50 mL Omnipaque 350 IV  CT Radiation dose: Integrated Dose-length product (DLP) for this visit =    106 mGy*cm  CT Dose Reduction Employed: Automated exposure control(AEC) and iterative  recon    Comparison: Cuts through the lung bases CT abdomen of fall 04/02/2025.    RESULT:    Limitations:   "None.    Lines, tubes, and devices:  None.    Lung parenchyma and airways: No consolidation. No suspicious pulmonary  nodule. The central airways are patent.    Pleural space:  No pleural effusion.  There is mild thickening of the  minor fissure.    Lower neck, lymph nodes, and mediastinum:    No lymphadenopathy in the  supraclavicular, axillary, mediastinal, or hilar regions.    Heart, pericardium, and thoracic vessels:  The thoracic aorta and main  pulmonary artery are normal in caliber. The cardiac chambers are normal  in size. No coronary artery atherosclerotic calcifications are noted,  although the study is not optimized for coronary assessment. No  pericardial effusion or thickening.    Bones and soft tissues:  No destructive bone lesion. Chest wall is  unremarkable.    Upper abdomen:  No abnormality in the imaged upper abdomen.    Localizer images: No additional findings.  Exam End: 05/09/25 08:52    Specimen Collected: 05/09/25 08:52 Last Resulted: 05/15/25 09:57   Received From: [facility]  Result Received: 06/02/25 13:22    View Encounter        Received Information      Echocardiogram     No results found for this or any previous visit from the past 365 days.       Labwork     Lab Results   Component Value Date    WBC 10.8 03/24/2025    HGB 13.7 03/24/2025    HCT 43.8 03/24/2025    MCV 88.7 03/24/2025     03/24/2025      Lab Results   Component Value Date    GLUCOSE 194 (H) 03/24/2025    CALCIUM 9.4 03/24/2025     03/24/2025    K 4.8 03/24/2025    CO2 31 03/24/2025    CL 97 (L) 03/24/2025    BUN 14 03/24/2025    CREATININE 0.59 (L) 03/24/2025      Lab Results   Component Value Date    ALT 9 03/24/2025    AST 14 03/24/2025    ALKPHOS 63 03/24/2025    BILITOT 0.5 03/24/2025        No results found for: \"PROTIME\", \"INR\", \"PTT\"    No results found for: \"ICIGE\", \"IGE\", \"ICA04\", \"ASPFU\", \"IGG\", \"IGA\", \"IGM\"    Peripheral Eosinophil Count/Percentage:   ABSOLUTE EOSINOPHILS (cells/uL)   Date " Value   03/24/2025 270     EOSINOPHILS (%)   Date Value   03/24/2025 2.5       ASSESSMENT/PLAN   Ms. Hauser is a 89 y.o. female, with a history of colon cancer on oral chemo, emphysema/severe COPD, hypertension, hyperlipidemia, DM2, osteopenia who is a former smoker (60 pack years), who presents to a [facility] for an initial evaluation for COPD/emphysema management an shortness of breath.     Hgb 12.2    Problem List and Orders  Problem List Items Addressed This Visit       COPD (chronic obstructive pulmonary disease) (Multi)    Relevant Medications    tiotropium (Spiriva) 18 mcg inhalation capsule    Dependence on supplemental oxygen    Relevant Medications    tiotropium (Spiriva) 18 mcg inhalation capsule     Other Visit Diagnoses         Hypoxia    -  Primary    Relevant Orders    Oxygen therapy for home      Wheezing        Relevant Medications    methylPREDNISolone (Medrol Dospak) 4 mg tablets            Assessment and Plan / Recommendations:  Problem List Items Addressed This Visit    None     COPD/Emphysema  - will get new PFTs  - continue Advair 250mcg 1 puff twice daily - rinse mouth after each use  - START Spiriva 18mcg once daily    Chronic hypoxic respiratory failure  - please wear 3L oxygen with activity and sleep    Follow up in 2-3 months or sooner if needed.    If you have any questions please call the office [phone]    Thank you for visiting the Pulmonary clinic today!   Jerrica Mora CNP  [phone]         [1]   Allergies  Allergen Reactions    Acetaminophen Unknown    Penicillins Other    Sulfa (Sulfonamide Antibiotics) Rash   [2]   Current Outpatient Medications   Medication Sig Dispense Refill    albuterol 90 mcg/actuation inhaler Inhale 2 puffs every 6 hours if needed for wheezing. 6.7 g 10    alendronate (Fosamax) 70 mg tablet Take 1 tablet (70 mg) by mouth every 7 days. Take in the morning with a full glass of water, on an empty stomach, and do  "not take anything else by mouth or lie down for the next 30 min. 4 tablet 11    Allergy Relief, fexofenadine, 180 mg tablet TAKE 1 TABLET BY MOUTH ONCE DAILY 30 tablet 10    atorvastatin (Lipitor) 10 mg tablet Take 1 tablet (10 mg) by mouth once daily. 90 tablet 3    calcium carbonate-vitamin D3 600 mg-5 mcg (200 unit) tablet Take 2 tablets by mouth once daily. 60 tablet 11    capecitabine (Xeloda) 500 mg tablet Take 2 tablets (1,000 mg total) by mouth twice a day.      DULoxetine (Cymbalta) 20 mg DR capsule Take 2 capsules (40 mg) by mouth once daily. Do not crush or chew. 60 capsule 5    fluticasone (Flonase) 50 mcg/actuation nasal spray Administer 1 spray into each nostril once daily. 9.9 mL 11    fluticasone propion-salmeteroL (Advair Diskus) 250-50 mcg/dose diskus inhaler INHALE 1 PUFF BY MOUTH EVERY 12 HOURS 60 each 11    insulin glargine (Lantus Solostar U-100 Insulin) 100 unit/mL (3 mL) pen  100 each 3    lidocaine (Lidoderm) 5 % patch Place 1 patch over 12 hours on the skin once daily. Apply to painful area 12 hours per day, remove for 12 hours. 30 patch 3    lisinopril 10 mg tablet TAKE 1 TABLET BY MOUTH ONCE EVERY 24 HOURS 30 tablet 10    metFORMIN (Glucophage) 1,000 mg tablet TAKE 1 TABLET BY MOUTH TWICE DAILY WITH MEALS 60 tablet 10    OneTouch Verio test strips strip USE TO TEST BLOOD SUGAR TWICE DAILY 100 strip 10    pen needle, diabetic (Novofine 32) 32 gauge x 1/4\" needle 1 each by abdominal subcutaneous route once daily. 100 each 3    tiotropium (Spiriva) 18 mcg inhalation capsule Place 1 capsule (18 mcg) into inhaler and inhale once daily. 30 capsule 0     No current facility-administered medications for this visit.   [3]   Past Surgical History:  Procedure Laterality Date    APPENDECTOMY  04/28/2025    and colon  surgery    OTHER SURGICAL HISTORY  09/09/2019    Gallbladder surgery    OTHER SURGICAL HISTORY  09/26/2022    Cataract surgery   [4] No family history on file.    "

## 2025-07-02 ENCOUNTER — APPOINTMENT (OUTPATIENT)
Facility: CLINIC | Age: 89
End: 2025-07-02
Payer: MEDICARE

## 2025-07-02 VITALS
SYSTOLIC BLOOD PRESSURE: 129 MMHG | HEART RATE: 83 BPM | OXYGEN SATURATION: 91 % | BODY MASS INDEX: 23.98 KG/M2 | WEIGHT: 127 LBS | DIASTOLIC BLOOD PRESSURE: 73 MMHG | TEMPERATURE: 98.2 F | HEIGHT: 61 IN

## 2025-07-02 DIAGNOSIS — Z99.81 DEPENDENCE ON SUPPLEMENTAL OXYGEN: ICD-10-CM

## 2025-07-02 DIAGNOSIS — J43.8 OTHER EMPHYSEMA (MULTI): ICD-10-CM

## 2025-07-02 DIAGNOSIS — R09.02 HYPOXIA: Primary | ICD-10-CM

## 2025-07-02 DIAGNOSIS — R06.2 WHEEZING: ICD-10-CM

## 2025-07-02 DIAGNOSIS — M85.80 OSTEOPENIA, UNSPECIFIED LOCATION: ICD-10-CM

## 2025-07-02 PROCEDURE — 1159F MED LIST DOCD IN RCRD: CPT

## 2025-07-02 PROCEDURE — 3078F DIAST BP <80 MM HG: CPT

## 2025-07-02 PROCEDURE — 1036F TOBACCO NON-USER: CPT

## 2025-07-02 PROCEDURE — 1126F AMNT PAIN NOTED NONE PRSNT: CPT

## 2025-07-02 PROCEDURE — 3074F SYST BP LT 130 MM HG: CPT

## 2025-07-02 PROCEDURE — 99204 OFFICE O/P NEW MOD 45 MIN: CPT

## 2025-07-02 RX ORDER — METHYLPREDNISOLONE 4 MG/1
TABLET ORAL
Qty: 21 TABLET | Refills: 0 | Status: SHIPPED | OUTPATIENT
Start: 2025-07-02 | End: 2025-07-08

## 2025-07-02 RX ORDER — TIOTROPIUM BROMIDE 18 UG/1
1 CAPSULE ORAL; RESPIRATORY (INHALATION)
Qty: 30 CAPSULE | Refills: 3 | Status: SHIPPED | OUTPATIENT
Start: 2025-07-02 | End: 2025-10-30

## 2025-07-02 ASSESSMENT — PATIENT HEALTH QUESTIONNAIRE - PHQ9
2. FEELING DOWN, DEPRESSED OR HOPELESS: NOT AT ALL
SUM OF ALL RESPONSES TO PHQ9 QUESTIONS 1 AND 2: 0
1. LITTLE INTEREST OR PLEASURE IN DOING THINGS: NOT AT ALL

## 2025-07-02 ASSESSMENT — PAIN SCALES - GENERAL: PAINLEVEL_OUTOF10: 0-NO PAIN

## 2025-07-03 RX ORDER — ALENDRONATE SODIUM 70 MG/1
TABLET ORAL
Qty: 4 TABLET | Refills: 2 | Status: SHIPPED | OUTPATIENT
Start: 2025-07-03

## 2025-07-08 NOTE — PROGRESS NOTES
Clinical Pharmacy Appointment    Patient ID: Lindsay Hauser is a 89 y.o. female who presents for COPD.    Pt is here for Follow Up appointment.     Referring Provider: Nilson Berumen MD  PCP: Nilson Berumen MD  Last visit with PCP: 6/4/2025   Next visit with PCP: 8/11/2025    Subjective   Medication Reconciliation:  Changed: N/A  Added: N/A  Discontinued: N/A    Drug Interactions  No relevant drug interactions were noted.    Medication System Management  Patient's preferred pharmacy: [facility]   Adherence/Organization: uses a pill box  Affordability/Accessibility: no issues reported, however did receive a bill from the pharmacy for vitamins/diclofenac gel      Interval History  Patient on chemotherapy for colon cancer    HPI  PULMONARY ASSESSMENT  Patient has been diagnosed with: Emphysema  Does patient see pulmonology: No- however has first appointment scheduled for 7/2/2025  has not had PFT's completed in last 2 years    Current Regimen  Albuterol 90 mcg/actuation inhaler- 2 puffs every 6 hours as needed  Advair Diskus 250-50 mcg/dose inhaler- 1 puff every 12 hours  Spiriva handihaler- inhale contents of 1 capsule daily- not yet started due to not receiving from the pharmacy yet  O2 nasal cannula    Clarifications to above regimen: has not yet started using Spiriva due to not receiving it from the pharmacy    Adverse Effects: none   Appropriate technique? N/A    Historical Treatment  None     Symptom Management   Current symptoms: dyspnea, cough, fatigue, and losing voice  Triggers: allergies, exertion  Alleviating factors: inhalers, resting, oxygen   Exercise capacity: 5 minute leg strengthening exercises, balancing exercises twice a day, limited d/t dyspnea  How often do you use your rescue inhaler? Every 6 hours   mMRC score: 3    Exacerbation Hx  When was your last hospitalization for an exacerbation? 7 years ago for bronchitis/pneumonia  When was the last time you were treated with  "antibiotics and/or steroids? Had antibiotics 2 months ago, last had steroids in fall 2024   Total number of hospitalizations d/t exacerbation: 1    Immunization History:  Influenza: Date [9/26/2022]  PCV13: Date [5/16/2016]  PPSV23: Date [10/2/2013]  PCV20: Date [6/4/2025]  COVID: Date [6/22/2021]  RSV: Date [DUE]    Smoking History  She quit smoking approximately 8 years ago (2017).    Objective   Allergies[1]  Social History     Social History Narrative    Not on file      Medication Review  Current Outpatient Medications   Medication Instructions    albuterol 90 mcg/actuation inhaler 2 puffs, inhalation, Every 6 hours PRN    alendronate (Fosamax) 70 mg tablet TAKE 1 TABLET (70 MG) BY MOUTH EVERY 7 DAYS IN THE MORNING WITH A FULL GLASS OF WATER ON AN EMPTY STOMACH, DONT TAKE ANYTHING ELSE OR LIE DOWN 30 MIN    atorvastatin (LIPITOR) 10 mg, oral, Daily    calcium carbonate-vitamin D3 600 mg-5 mcg (200 unit) tablet 2 tablets, oral, Daily    capecitabine (XELODA) 1,000 mg, 2 times daily    DULoxetine (CYMBALTA) 40 mg, oral, Daily, Do not crush or chew.    fexofenadine (ALLEGRA) 180 mg, oral, Daily    fluticasone (Flonase) 50 mcg/actuation nasal spray 1 spray, Each Nostril, Daily RT    fluticasone propion-salmeteroL (Advair Diskus) 250-50 mcg/dose diskus inhaler 1 puff, inhalation, Every 12 hours    insulin glargine (Lantus Solostar U-100 Insulin) 100 unit/mL (3 mL) pen No dose, route, or frequency recorded.    lidocaine (Lidoderm) 5 % patch 1 patch, transdermal, Daily, Apply to painful area 12 hours per day, remove for 12 hours.    lisinopril 10 mg tablet TAKE 1 TABLET BY MOUTH ONCE EVERY 24 HOURS    metFORMIN (GLUCOPHAGE) 1,000 mg, oral, 2 times daily (morning and late afternoon)    OneTouch Verio test strips strip USE TO TEST BLOOD SUGAR TWICE DAILY    pen needle, diabetic (Novofine 32) 32 gauge x 1/4\" needle 1 each, abdominal subcutaneous, Daily    tiotropium (SPIRIVA) 18 mcg, inhalation, Daily RT      Vitals  BP " Readings from Last 2 Encounters:   07/02/25 129/73   06/04/25 110/60     BMI Readings from Last 1 Encounters:   07/02/25 24.00 kg/m²      Labs  A1C  Lab Results   Component Value Date    HGBA1C 6.9 (A) 05/05/2025    HGBA1C 7.6 (A) 12/23/2024    HGBA1C 8.1 (A) 09/16/2024     BMP  Lab Results   Component Value Date    CALCIUM 9.4 03/24/2025     03/24/2025    K 4.8 03/24/2025    CO2 31 03/24/2025    CL 97 (L) 03/24/2025    BUN 14 03/24/2025    CREATININE 0.59 (L) 03/24/2025    EGFR 87 03/24/2025     LFTs  Lab Results   Component Value Date    ALT 9 03/24/2025    AST 14 03/24/2025    ALKPHOS 63 03/24/2025    BILITOT 0.5 03/24/2025     FLP  Lab Results   Component Value Date    TRIG 108 03/24/2025    CHOL 170 03/24/2025    LDLF 82 12/30/2022    LDLCALC 95 03/24/2025    HDL 55 03/24/2025     Urine Microalbumin  Lab Results   Component Value Date    MICROALBCREA 16 03/24/2025     Weight Management  Wt Readings from Last 3 Encounters:   07/02/25 57.6 kg (127 lb)   06/04/25 58.7 kg (129 lb 6.4 oz)   05/05/25 59.9 kg (132 lb)      There is no height or weight on file to calculate BMI.     Assessment/Plan   Problem List Items Addressed This Visit       COPD (chronic obstructive pulmonary disease) (Multi)    Patient with COPD that is stable and poorly controlled. Based on CAT score, exacerbation history, and eosinophil count, patient is GOLD Group E and LAMA+LABA therapy is recommended. Patient is currently using an Advair Diskus inhaler and albuterol inhaler as needed. Patient also has a nasal cannula for oxygen at home. Patient's COPD symptoms include shortness of breath, cough, fatigue, and occasionally losing her voice. Symptoms are typically triggered by allergies and exertion and are alleviated by inhaler use, oxygen, and rest. Patient does leg strengthening and balancing exercises a couple of times per day for exercise. As patient currently does not use a LAMA inhaler, previously initiated a Spiriva handihaler to  hopefully help with reducing patient's COPD symptoms. Pulmonology agreed with starting Spiriva. Patient has not yet been able to start Spiriva due to not yet receiving inhaler from the pharmacy. Prescription for Spiriva has been re-sent. May consider switching to an ICS+LAMA+LABA inhaler in the future. Follow-up in 3 weeks.     Medication Changes:  CONTINUE  Albuterol 90 mcg/actuation inhaler- 2 puffs every 6 hours as needed  Advair Diskus 250-50 mcg/dose inhaler- 1 puff every 12 hours  O2 nasal cannula  START  Spiriva handihaler- inhale contents of 1 capsule daily    Future Considerations:  Consider switching to Breztri Aerosphere if needed    Monitoring and Education:  Counseled patient on importance of using maintenance inhalers daily  Counseled patient on how to use Spiriva handihaler         Relevant Orders    Referral to Clinical Pharmacy    Dependence on supplemental oxygen    Relevant Orders    Referral to Clinical Pharmacy     Clinical Pharmacist follow-up: 7/30/25 @ 1:00 PM, Telehealth visit  Patient is not followed in [facility].     Continue all meds under the continuation of care with the referring provider and clinical pharmacy team.    Thank you,  Emma Sanchez, PharmD  Clinical Pharmacist  [phone]    Verbal consent to manage patient's drug therapy was obtained from the patient. They were informed they may decline to participate or withdraw from participation in pharmacy services at any time.         [1]   Allergies  Allergen Reactions    Acetaminophen Unknown    Penicillins Other    Sulfa (Sulfonamide Antibiotics) Rash

## 2025-07-09 ENCOUNTER — APPOINTMENT (OUTPATIENT)
Dept: PHARMACY | Facility: HOSPITAL | Age: 89
End: 2025-07-09
Payer: MEDICARE

## 2025-07-09 DIAGNOSIS — J43.8 OTHER EMPHYSEMA (MULTI): ICD-10-CM

## 2025-07-09 DIAGNOSIS — Z99.81 DEPENDENCE ON SUPPLEMENTAL OXYGEN: ICD-10-CM

## 2025-07-09 NOTE — ASSESSMENT & PLAN NOTE
Patient with COPD that is stable and poorly controlled. Based on CAT score, exacerbation history, and eosinophil count, patient is GOLD Group E and LAMA+LABA therapy is recommended. Patient is currently using an Advair Diskus inhaler and albuterol inhaler as needed. Patient also has a nasal cannula for oxygen at home. Patient's COPD symptoms include shortness of breath, cough, fatigue, and occasionally losing her voice. Symptoms are typically triggered by allergies and exertion and are alleviated by inhaler use, oxygen, and rest. Patient does leg strengthening and balancing exercises a couple of times per day for exercise. As patient currently does not use a LAMA inhaler, previously initiated a Spiriva handihaler to hopefully help with reducing patient's COPD symptoms. Pulmonology agreed with starting Spiriva. Patient has not yet been able to start Spiriva due to not yet receiving inhaler from the pharmacy. Prescription for Spiriva has been re-sent. May consider switching to an ICS+LAMA+LABA inhaler in the future. Follow-up in 3 weeks.     Medication Changes:  CONTINUE  Albuterol 90 mcg/actuation inhaler- 2 puffs every 6 hours as needed  Advair Diskus 250-50 mcg/dose inhaler- 1 puff every 12 hours  O2 nasal cannula  START  Spiriva handihaler- inhale contents of 1 capsule daily    Future Considerations:  Consider switching to Breztri Aerosphere if needed    Monitoring and Education:  Counseled patient on importance of using maintenance inhalers daily  Counseled patient on how to use Spiriva handihaler

## 2025-07-22 ENCOUNTER — PATIENT OUTREACH (OUTPATIENT)
Dept: PRIMARY CARE | Facility: CLINIC | Age: 89
End: 2025-07-22
Payer: MEDICARE

## 2025-07-22 NOTE — PROGRESS NOTES
Final call. Called and spoke with patient daughter Halley to address any questions or concerns regarding hospitalization.   Halley reports pt is doing well but that she was getting ready to call the office due to pt having a fall yesterday. Pt is not currently on blood thinners.  Per Halley pt seems to be having issues with depth perception. She states she had fallen on the street and did hit her head. Halley reports pt having bruising to the face, knees, and pain in her sides. She denies any loss of consciousness, headache, or altered mentation. Halley states pt is fine aside from pain.     Pt scheduled for soonest available appt 7/28/2025 1600 and linked to waitlist. Inbasket message sent to office clerical pool to see if sooner appt could be provided. Detailed update sent to Dr. Berumen via [employer] secure chat.    Halley denies any other questions, needs, or concerns. She is encouraged to call if questions or needs arise.    [id number]; called and notified Halley per Dr. Berumen pt to take tylenol for pain and go to ER for any headache or vomiting. Halley reports understanding.

## 2025-07-25 ENCOUNTER — TELEPHONE (OUTPATIENT)
Dept: PRIMARY CARE | Facility: CLINIC | Age: 89
End: 2025-07-25
Payer: MEDICARE

## 2025-07-25 NOTE — TELEPHONE ENCOUNTER
Will Dr Berumen follow for Skilled Nursing ,OT,PT? Patient is being discharged from [facility] Today.     Nurse Radha can be reached at [phone]    Please advise

## 2025-07-28 ENCOUNTER — APPOINTMENT (OUTPATIENT)
Dept: PRIMARY CARE | Facility: CLINIC | Age: 89
End: 2025-07-28
Payer: MEDICARE

## 2025-07-29 ENCOUNTER — PATIENT OUTREACH (OUTPATIENT)
Dept: PRIMARY CARE | Facility: CLINIC | Age: 89
End: 2025-07-29
Payer: MEDICARE

## 2025-07-29 RX ORDER — AMOXICILLIN AND CLAVULANATE POTASSIUM 875; 125 MG/1; MG/1
875 TABLET, FILM COATED ORAL 2 TIMES DAILY
COMMUNITY
End: 2025-07-30 | Stop reason: WASHOUT

## 2025-07-29 RX ORDER — LIDOCAINE 560 MG/1
1 PATCH PERCUTANEOUS; TOPICAL; TRANSDERMAL DAILY
COMMUNITY

## 2025-07-29 RX ORDER — OXYCODONE HYDROCHLORIDE 5 MG/1
5 TABLET ORAL EVERY 8 HOURS PRN
COMMUNITY
End: 2025-07-30 | Stop reason: SDUPTHER

## 2025-07-29 RX ORDER — AZITHROMYCIN 250 MG/1
250 TABLET, FILM COATED ORAL DAILY
COMMUNITY
End: 2025-07-30 | Stop reason: WASHOUT

## 2025-07-29 NOTE — PROGRESS NOTES
Discharge Facility: [facility]  Discharge Diagnosis: Fall, rib fracture  Admission Date: 7/23/2025  Discharge Date: 7/28/2025    PCP Appointment Date:  -7/30/2025 1520    Specialist Appointment Date:   -7/30/2025 1300 virtual pharmacy    Hospital Encounter and Summary Linked: Yes    Hospital Encounter      See discharge assessment below for further details    Wrap Up  Wrap Up Additional Comments: Pt was admitted to [facility] 7/23-7/28/2025 for fall. Pt dx with rib fracture. Spoke with pt daughter Halley who reports pt is doing much better since discharge. Pt discharged with prescriptions for augmentin, zithromax, oxycodone, and lidocaine; Halley denies questions or issues regarding medication. She reports understanding of discharge instructions. Verified pt PCP appt tomorrow 7/30/2025 1520. Verified pt virtual pharmacy appt tomorrow 7/30/2025 1300. Halley denies any questions, needs, or concerns at this time. She is encouraged to call if questions or needs arise. (7/29/2025 10:00 AM)  Call End Time: 1001 (7/29/2025 10:00 AM)    Engagement  Call Start Time: 0955 (7/29/2025 10:00 AM)    Medications  Medications reviewed with patient/caregiver?: Yes (new prescriptions reviewed; lidocaine, augmentin, zithromax, oxycodone) (7/29/2025 10:00 AM)  Is the patient having any side effects they believe may be caused by any medication additions or changes?: No (7/29/2025 10:00 AM)  Does the patient have all medications ordered at discharge?: Yes (7/29/2025 10:00 AM)  Care Management Interventions: No intervention needed (7/29/2025 10:00 AM)  Is the patient taking all medications as directed (includes completed medication regime)?: Yes (7/29/2025 10:00 AM)    Appointments  Does the patient have a primary care provider?: Yes (7/29/2025 10:00 AM)  Care Management Interventions: Verified appointment date/time/provider (7/29/2025 10:00 AM)  Has the patient kept scheduled appointments  due by today?: Yes (7/29/2025 10:00 AM)    Self Management  Has home health visited the patient within 72 hours of discharge?: Not applicable (7/29/2025 10:00 AM)    Patient Teaching  Does the patient have access to their discharge instructions?: Yes (7/29/2025 10:00 AM)  Care Management Interventions: Reviewed instructions with patient (7/29/2025 10:00 AM)  What is the patient's perception of their health status since discharge?: Improving (7/29/2025 10:00 AM)  Is the patient/caregiver able to teach back the hierarchy of who to call/visit for symptoms/problems? PCP, Specialist, Home Health nurse, Urgent Care, ED, 911: Yes (7/29/2025 10:00 AM)

## 2025-07-29 NOTE — PROGRESS NOTES
Clinical Pharmacy Appointment    Patient ID: Lindsay Hauser is a 89 y.o. female who presents for COPD.    Pt is here for Follow Up appointment.     Referring Provider: Nilson Berumen MD  PCP: Nilson Berumen MD  Last visit with PCP: 6/4/2025   Next visit with PCP: 8/11/2025    Subjective   Medication Reconciliation:  Changed: N/A  Added: N/A  Discontinued: N/A    Drug Interactions  No relevant drug interactions were noted.    Medication System Management  Patient's preferred pharmacy: [facility]   Adherence/Organization: uses a pill box  Affordability/Accessibility: no issues reported, however did receive a bill from the pharmacy for vitamins/diclofenac gel      Interval History  Patient on chemotherapy for colon cancer    HPI  PULMONARY ASSESSMENT  Patient has been diagnosed with: Emphysema  Does patient see pulmonology: No- however has first appointment scheduled for 7/2/2025  has not had PFT's completed in last 2 years    Current Regimen  Albuterol 90 mcg/actuation inhaler- 2 puffs every 6 hours as needed  Advair Diskus 250-50 mcg/dose inhaler- 1 puff every 12 hours  Spiriva handihaler- inhale contents of 1 capsule daily- not yet started due to not receiving from the pharmacy yet   O2 nasal cannula    Clarifications to above regimen: has not yet started using Spiriva due to not receiving it from the pharmacy    Adverse Effects: none   Appropriate technique? N/A    Historical Treatment  None     Symptom Management   Current symptoms: dyspnea, cough, fatigue, and losing voice  Triggers: allergies, exertion  Alleviating factors: inhalers, resting, oxygen   Exercise capacity: 5 minute leg strengthening exercises, balancing exercises twice a day, limited d/t dyspnea  How often do you use your rescue inhaler? Every 6 hours   mMRC score: 3    Exacerbation Hx  When was your last hospitalization for an exacerbation? 7 years ago for bronchitis/pneumonia  When was the last time you were treated with  antibiotics and/or steroids? Had antibiotics 2 months ago, last had steroids in fall 2024   Total number of hospitalizations d/t exacerbation: 1    Immunization History:  Influenza: Date [9/26/2022]  PCV13: Date [5/16/2016]  PPSV23: Date [10/2/2013]  PCV20: Date [6/4/2025]  COVID: Date [6/22/2021]  RSV: Date [DUE]    Smoking History  She quit smoking approximately 8 years ago (2017).    Objective   Allergies[1]  Social History     Social History Narrative    Not on file      Medication Review  Current Outpatient Medications   Medication Instructions    albuterol 90 mcg/actuation inhaler 2 puffs, inhalation, Every 6 hours PRN    alendronate (Fosamax) 70 mg tablet TAKE 1 TABLET (70 MG) BY MOUTH EVERY 7 DAYS IN THE MORNING WITH A FULL GLASS OF WATER ON AN EMPTY STOMACH, DONT TAKE ANYTHING ELSE OR LIE DOWN 30 MIN    amoxicillin-clavulanate (Augmentin) 875-125 mg tablet 875 mg of amoxicillin, 2 times daily    atorvastatin (LIPITOR) 10 mg, oral, Daily    azithromycin (ZITHROMAX) 250 mg, Daily    calcium carbonate-vitamin D3 600 mg-5 mcg (200 unit) tablet 2 tablets, oral, Daily    capecitabine (XELODA) 1,000 mg, 2 times daily    DULoxetine (CYMBALTA) 40 mg, oral, Daily, Do not crush or chew.    fexofenadine (ALLEGRA) 180 mg, oral, Daily    fluticasone (Flonase) 50 mcg/actuation nasal spray 1 spray, Each Nostril, Daily RT    fluticasone propion-salmeteroL (Advair Diskus) 250-50 mcg/dose diskus inhaler 1 puff, inhalation, Every 12 hours    insulin glargine (Lantus Solostar U-100 Insulin) 100 unit/mL (3 mL) pen No dose, route, or frequency recorded.    lidocaine (Lidoderm) 5 % patch 1 patch, transdermal, Daily, Apply to painful area 12 hours per day, remove for 12 hours.    lidocaine 4 % patch 1 patch, Daily    lisinopril 10 mg tablet TAKE 1 TABLET BY MOUTH ONCE EVERY 24 HOURS    metFORMIN (GLUCOPHAGE) 1,000 mg, oral, 2 times daily (morning and late afternoon)    OneTouch Verio test strips strip USE TO TEST BLOOD SUGAR TWICE  "DAILY    oxyCODONE (ROXICODONE) 5 mg, Every 8 hours PRN    pen needle, diabetic (Novofine 32) 32 gauge x 1/4\" needle 1 each, abdominal subcutaneous, Daily    tiotropium (SPIRIVA) 18 mcg, inhalation, Daily RT      Vitals  BP Readings from Last 2 Encounters:   07/02/25 129/73   06/04/25 110/60     BMI Readings from Last 1 Encounters:   07/02/25 24.00 kg/m²      Labs  A1C  Lab Results   Component Value Date    HGBA1C 7.4 (H) 07/24/2025    HGBA1C 7.5 (H) 07/24/2025    HGBA1C 6.9 (A) 05/05/2025     BMP  Lab Results   Component Value Date    CALCIUM 9.4 03/24/2025     03/24/2025    K 4.8 03/24/2025    CO2 31 03/24/2025    CL 97 (L) 03/24/2025    BUN 14 03/24/2025    CREATININE 0.59 (L) 03/24/2025    EGFR 87 03/24/2025     LFTs  Lab Results   Component Value Date    ALT 9 03/24/2025    AST 14 03/24/2025    ALKPHOS 63 03/24/2025    BILITOT 0.5 03/24/2025     FLP  Lab Results   Component Value Date    TRIG 108 03/24/2025    CHOL 170 03/24/2025    LDLF 82 12/30/2022    LDLCALC 95 03/24/2025    HDL 55 03/24/2025     Urine Microalbumin  Lab Results   Component Value Date    MICROALBCREA 16 03/24/2025     Weight Management  Wt Readings from Last 3 Encounters:   07/02/25 57.6 kg (127 lb)   06/04/25 58.7 kg (129 lb 6.4 oz)   05/05/25 59.9 kg (132 lb)      There is no height or weight on file to calculate BMI.     Assessment/Plan   Problem List Items Addressed This Visit       COPD (chronic obstructive pulmonary disease) (Multi)    Patient with COPD that is stable and poorly controlled. Based on CAT score, exacerbation history, and eosinophil count, patient is GOLD Group E and LAMA+LABA therapy is recommended. Patient is currently using an Advair Diskus inhaler and albuterol inhaler as needed. Patient also has a nasal cannula for oxygen at home. Patient's COPD symptoms include shortness of breath, cough, fatigue, and occasionally losing her voice. Symptoms are typically triggered by allergies and exertion and are alleviated " by inhaler use, oxygen, and rest. Patient does leg strengthening and balancing exercises a couple of times per day for exercise. As patient currently does not use a LAMA inhaler, previously initiated a Spiriva handihaler to hopefully help with reducing patient's COPD symptoms. Pulmonology agreed with starting Spiriva. Patient has not yet been able to start Spiriva due to not yet receiving inhaler from the mail order pharmacy. As patient still has not received Spiriva inhaler from mail order pharmacy, will send prescription to [facility] so patient can get started on inhaler sooner. . May consider switching to an ICS+LAMA+LABA inhaler in the future. Follow-up in 4 weeks.     Medication Changes:  CONTINUE  Albuterol 90 mcg/actuation inhaler- 2 puffs every 6 hours as needed  Advair Diskus 250-50 mcg/dose inhaler- 1 puff every 12 hours  O2 nasal cannula  START  Spiriva handihaler- inhale contents of 1 capsule daily    Future Considerations:  Consider switching to Breztri Aerosphere if needed    Monitoring and Education:  Counseled patient on importance of using maintenance inhalers daily  Counseled patient on how to use Spiriva handihaler         Relevant Medications    tiotropium (Spiriva) 18 mcg inhalation capsule    Other Relevant Orders    Referral to Clinical Pharmacy    Dependence on supplemental oxygen    Relevant Medications    tiotropium (Spiriva) 18 mcg inhalation capsule    Other Relevant Orders    Referral to Clinical Pharmacy     Clinical Pharmacist follow-up: 8/27/25 @ 1:00 PM, Telehealth visit  Patient is not followed in [facility].     Continue all meds under the continuation of care with the referring provider and clinical pharmacy team.    Thank you,  Emma Sanchez, PharmD  Clinical Pharmacist  [phone]    Verbal consent to manage patient's drug therapy was obtained from the patient. They were informed they may decline to participate or withdraw from participation in pharmacy services at any time.          [1]   Allergies  Allergen Reactions    Acetaminophen Unknown    Penicillins Other    Sulfa (Sulfonamide Antibiotics) Rash

## 2025-07-30 ENCOUNTER — APPOINTMENT (OUTPATIENT)
Dept: PHARMACY | Facility: HOSPITAL | Age: 89
End: 2025-07-30
Payer: MEDICARE

## 2025-07-30 ENCOUNTER — OFFICE VISIT (OUTPATIENT)
Dept: PRIMARY CARE | Facility: CLINIC | Age: 89
End: 2025-07-30
Payer: MEDICARE

## 2025-07-30 VITALS
OXYGEN SATURATION: 93 % | WEIGHT: 132 LBS | HEART RATE: 96 BPM | SYSTOLIC BLOOD PRESSURE: 120 MMHG | DIASTOLIC BLOOD PRESSURE: 60 MMHG | TEMPERATURE: 97.1 F | RESPIRATION RATE: 18 BRPM | BODY MASS INDEX: 24.94 KG/M2

## 2025-07-30 DIAGNOSIS — J43.8 OTHER EMPHYSEMA (MULTI): ICD-10-CM

## 2025-07-30 DIAGNOSIS — M54.40 ACUTE BILATERAL LOW BACK PAIN WITH SCIATICA, SCIATICA LATERALITY UNSPECIFIED: ICD-10-CM

## 2025-07-30 DIAGNOSIS — Z99.81 DEPENDENCE ON SUPPLEMENTAL OXYGEN: ICD-10-CM

## 2025-07-30 DIAGNOSIS — S22.42XD CLOSED FRACTURE OF MULTIPLE RIBS OF LEFT SIDE WITH ROUTINE HEALING: ICD-10-CM

## 2025-07-30 DIAGNOSIS — R91.1 LUNG NODULE: ICD-10-CM

## 2025-07-30 DIAGNOSIS — M79.605 PAIN IN BOTH LOWER EXTREMITIES: ICD-10-CM

## 2025-07-30 DIAGNOSIS — S22.32XS CLOSED FRACTURE OF ONE RIB OF LEFT SIDE, SEQUELA: Primary | ICD-10-CM

## 2025-07-30 DIAGNOSIS — C18.9: ICD-10-CM

## 2025-07-30 DIAGNOSIS — Z09 HOSPITAL DISCHARGE FOLLOW-UP: ICD-10-CM

## 2025-07-30 DIAGNOSIS — M79.604 PAIN IN BOTH LOWER EXTREMITIES: ICD-10-CM

## 2025-07-30 PROBLEM — S22.32XA CLOSED FRACTURE OF ONE RIB OF LEFT SIDE: Status: ACTIVE | Noted: 2025-07-30

## 2025-07-30 PROBLEM — S29.9XXA TRAUMATIC INJURY OF RIB: Status: ACTIVE | Noted: 2025-07-24

## 2025-07-30 PROBLEM — S22.42XA MULTIPLE CLOSED FRACTURES OF RIBS OF LEFT SIDE: Status: RESOLVED | Noted: 2025-07-23 | Resolved: 2025-07-30

## 2025-07-30 PROBLEM — S22.42XA MULTIPLE CLOSED FRACTURES OF RIBS OF LEFT SIDE: Status: ACTIVE | Noted: 2025-07-23

## 2025-07-30 PROBLEM — D09.9 ADENOCARCINOMA IN SITU: Status: ACTIVE | Noted: 2025-07-24

## 2025-07-30 PROCEDURE — 1159F MED LIST DOCD IN RCRD: CPT | Performed by: INTERNAL MEDICINE

## 2025-07-30 PROCEDURE — 99496 TRANSJ CARE MGMT HIGH F2F 7D: CPT | Performed by: INTERNAL MEDICINE

## 2025-07-30 PROCEDURE — 3074F SYST BP LT 130 MM HG: CPT | Performed by: INTERNAL MEDICINE

## 2025-07-30 PROCEDURE — 1160F RVW MEDS BY RX/DR IN RCRD: CPT | Performed by: INTERNAL MEDICINE

## 2025-07-30 PROCEDURE — 3078F DIAST BP <80 MM HG: CPT | Performed by: INTERNAL MEDICINE

## 2025-07-30 RX ORDER — DULOXETIN HYDROCHLORIDE 60 MG/1
60 CAPSULE, DELAYED RELEASE ORAL DAILY
Qty: 30 CAPSULE | Refills: 3 | Status: SHIPPED | OUTPATIENT
Start: 2025-07-30 | End: 2026-01-26

## 2025-07-30 RX ORDER — OXYCODONE HYDROCHLORIDE 5 MG/1
5 TABLET ORAL EVERY 8 HOURS PRN
Qty: 15 TABLET | Refills: 0 | Status: SHIPPED | OUTPATIENT
Start: 2025-07-30 | End: 2025-07-31 | Stop reason: SDUPTHER

## 2025-07-30 RX ORDER — OXYCODONE HYDROCHLORIDE 5 MG/1
5 TABLET ORAL EVERY 8 HOURS PRN
Qty: 15 TABLET | Refills: 0 | Status: SHIPPED | OUTPATIENT
Start: 2025-07-30 | End: 2025-07-30

## 2025-07-30 RX ORDER — TIOTROPIUM BROMIDE 18 UG/1
1 CAPSULE ORAL; RESPIRATORY (INHALATION)
Qty: 30 CAPSULE | Refills: 3 | Status: SHIPPED | OUTPATIENT
Start: 2025-07-30 | End: 2025-11-27

## 2025-07-30 ASSESSMENT — ENCOUNTER SYMPTOMS
ARTHRALGIAS: 0
DYSURIA: 0
CONSTIPATION: 0
CHILLS: 0
PALPITATIONS: 0
SLEEP DISTURBANCE: 0
DIARRHEA: 0
FATIGUE: 0
WHEEZING: 0
JOINT SWELLING: 0
ADENOPATHY: 0
SORE THROAT: 0
COUGH: 0
WEAKNESS: 0
NAUSEA: 0
SHORTNESS OF BREATH: 0
CHEST TIGHTNESS: 0
ABDOMINAL PAIN: 0
VOMITING: 0
CONFUSION: 0
UNEXPECTED WEIGHT CHANGE: 0
DIZZINESS: 0

## 2025-07-30 NOTE — PATIENT INSTRUCTIONS
Fall precaution  Pain control  Increase cymbolta to 60 mg  Tylenol prn, lidoderm patch  Oxycodone prn , to taper down.

## 2025-07-30 NOTE — ASSESSMENT & PLAN NOTE
Patient with COPD that is stable and poorly controlled. Based on CAT score, exacerbation history, and eosinophil count, patient is GOLD Group E and LAMA+LABA therapy is recommended. Patient is currently using an Advair Diskus inhaler and albuterol inhaler as needed. Patient also has a nasal cannula for oxygen at home. Patient's COPD symptoms include shortness of breath, cough, fatigue, and occasionally losing her voice. Symptoms are typically triggered by allergies and exertion and are alleviated by inhaler use, oxygen, and rest. Patient does leg strengthening and balancing exercises a couple of times per day for exercise. As patient currently does not use a LAMA inhaler, previously initiated a Spiriva handihaler to hopefully help with reducing patient's COPD symptoms. Pulmonology agreed with starting Spiriva. Patient has not yet been able to start Spiriva due to not yet receiving inhaler from the mail order pharmacy. As patient still has not received Spiriva inhaler from mail order pharmacy, will send prescription to "[facility]s so patient can get started on inhaler sooner. . May consider switching to an ICS+LAMA+LABA inhaler in the future. Follow-up in 4 weeks.     Medication Changes:  CONTINUE  Albuterol 90 mcg/actuation inhaler- 2 puffs every 6 hours as needed  Advair Diskus 250-50 mcg/dose inhaler- 1 puff every 12 hours  O2 nasal cannula  START  Spiriva handihaler- inhale contents of 1 capsule daily    Future Considerations:  Consider switching to Breztri Aerosphere if needed    Monitoring and Education:  Counseled patient on importance of using maintenance inhalers daily  Counseled patient on how to use Spiriva handihaler

## 2025-07-30 NOTE — PROGRESS NOTES
Subjective   Lindsay Hauser is a 89 y.o. female who presents for Hospital Follow-up (Hospital follow  up -7.23-7.28.2025).  [facility]  Acute pain  Fall, initial encounter  Rib fracture  Traumatic injury of rib  Contusion of other part of head, initial encounter  Had an accidental fall, was in hospital for 1 week,    Discharge Diagnosis: Fall, rib fracture  Admission Date: 7/23/2025  Discharge Date: 7/28/2025     PCP Appointment Date:  -7/30/2025 1520     Specialist Appointment Date:   -7/30/2025 1300 virtual pharmacy      TCM call- 7.29.2025  Pt was admitted to [facility] 7/23-7/28/2025 for fall. Pt dx with rib fracture. Spoke with pt daughter Halley who reports pt is doing much better since discharge. Pt discharged with prescriptions for augmentin, zithromax, oxycodone, and lidocaine; Halley denies questions or issues regarding medication. She reports understanding of discharge instructions. Verified pt PCP appt tomorrow 7/30/2025 1520. Verified pt virtual pharmacy appt tomorrow 7/30/2025 1300. Halley denies any questions, needs, or concerns at this time. She is encouraged to call if questions or needs arise.     Daughter  Halley with patient  today  for appointment       Review of Systems   Constitutional:  Negative for chills, fatigue and unexpected weight change.        Comment   HENT:  Negative for congestion, ear pain and sore throat.    Respiratory:  Negative for cough, chest tightness, shortness of breath and wheezing.    Cardiovascular:  Negative for palpitations and leg swelling.   Gastrointestinal:  Negative for abdominal pain, constipation, diarrhea, nausea and vomiting.   Genitourinary:  Negative for dysuria and urgency.   Musculoskeletal:  Negative for arthralgias and joint swelling.   Skin:  Negative for rash.   Neurological:  Negative for dizziness and weakness.    Hematological:  Negative for adenopathy.   Psychiatric/Behavioral:  Negative for confusion and sleep disturbance.        Objective   Physical Exam  Constitutional:       Appearance: Normal appearance.      Comments: Several bruises face  lung clear , no wheezing   HENT:      Head: Normocephalic and atraumatic.     Eyes:      Pupils: Pupils are equal, round, and reactive to light.       Cardiovascular:      Rate and Rhythm: Normal rate and regular rhythm.   Pulmonary:      Effort: Pulmonary effort is normal.      Breath sounds: Normal breath sounds.     Musculoskeletal:         General: Normal range of motion.      Cervical back: Normal range of motion and neck supple.     Skin:     General: Skin is warm.     Neurological:      General: No focal deficit present.      Mental Status: She is alert and oriented to person, place, and time.     Psychiatric:         Mood and Affect: Mood normal.         Behavior: Behavior normal.       /60 (BP Location: Left arm, Patient Position: Sitting)   Pulse 96   Temp 36.2 °C (97.1 °F)   Resp 18   Wt 59.9 kg (132 lb)   SpO2 93%   BMI 24.94 kg/m²     IMPRESSION:     Nondisplaced buckle fractures in the left sixth through 10th ribs.     No pulmonary contusion or pleural effusion or pneumothorax.     A 5 mm left upper lobe subpleural pulmonary nodule.   Incidental Finding:  Follow-up   Acuity: Incidental   Finding: Solid: <6 mm (solitary or multiple)   Routing Code:  N/A   Recommendation: No imaging follow-up is recommended   Time Frame:  N/A   Comments:  If there are risk factors for lung malignancy, a follow-up   chest CT exam could be obtained in 12 months   Assessment/Plan   Problem List Items Addressed This Visit       Pain in lower limb    Relevant Medications    DULoxetine (Cymbalta) 60 mg DR capsule    COPD (chronic obstructive pulmonary disease) (Multi)    Lumbago    Relevant Medications    DULoxetine (Cymbalta) 60 mg DR capsule    Stage II adenocarcinoma of colon     Hospital discharge follow-up    Dependence on supplemental oxygen    Closed fracture of multiple ribs of left side with routine healing    Tylenol, increase Cymbalta, lidoderm pach,  Oxycodone prn         Closed fracture of one rib of left side - Primary    Relevant Medications    oxyCODONE (Roxicodone) 5 mg immediate release tablet    Lung nodule    Relevant Orders    CT chest wo IV contrast

## 2025-07-31 ENCOUNTER — TELEPHONE (OUTPATIENT)
Dept: PRIMARY CARE | Facility: CLINIC | Age: 89
End: 2025-07-31
Payer: MEDICARE

## 2025-07-31 DIAGNOSIS — S22.32XS CLOSED FRACTURE OF ONE RIB OF LEFT SIDE, SEQUELA: ICD-10-CM

## 2025-07-31 RX ORDER — OXYCODONE HYDROCHLORIDE 5 MG/1
5 TABLET ORAL EVERY 8 HOURS PRN
Qty: 15 TABLET | Refills: 0 | Status: SHIPPED | OUTPATIENT
Start: 2025-07-31

## 2025-07-31 NOTE — TELEPHONE ENCOUNTER
[facility] needs OXY RX Resubmitted it got cancelled by mistake in their system make sure it has ICD CODE     need OXY RX Resubmitted it got cancelled by mistake in their system make sure it has ICD CODE     [facility] [phone]    Please advise

## 2025-08-01 DIAGNOSIS — M54.40 ACUTE BILATERAL LOW BACK PAIN WITH SCIATICA, SCIATICA LATERALITY UNSPECIFIED: Primary | ICD-10-CM

## 2025-08-04 ENCOUNTER — APPOINTMENT (OUTPATIENT)
Dept: PODIATRY | Facility: CLINIC | Age: 89
End: 2025-08-04
Payer: MEDICARE

## 2025-08-04 ENCOUNTER — PATIENT OUTREACH (OUTPATIENT)
Dept: PRIMARY CARE | Facility: CLINIC | Age: 89
End: 2025-08-04

## 2025-08-04 DIAGNOSIS — M79.674 PAIN IN TOES OF BOTH FEET: ICD-10-CM

## 2025-08-04 DIAGNOSIS — M79.675 PAIN IN TOES OF BOTH FEET: ICD-10-CM

## 2025-08-04 DIAGNOSIS — B35.1 NAIL FUNGUS: ICD-10-CM

## 2025-08-04 DIAGNOSIS — E11.9 TYPE 2 DIABETES MELLITUS WITHOUT RETINOPATHY (MULTI): Primary | ICD-10-CM

## 2025-08-04 PROCEDURE — 11721 DEBRIDE NAIL 6 OR MORE: CPT | Performed by: PODIATRIST

## 2025-08-04 RX ORDER — LIDOCAINE 50 MG/G
PATCH TOPICAL
Qty: 30 PATCH | Refills: 11 | Status: SHIPPED | OUTPATIENT
Start: 2025-08-04

## 2025-08-04 NOTE — PROGRESS NOTES
History Of Present Illness  Lindsay Hauser is a 89 y.o. female presenting for diabetic nail care. Patient complains with painful elongated nails.   Increased pain, she fx 4 ribs     PCP  Nilson Berumen MD  Last visit 7/30/25     Past Medical History  She has no past medical history on file.    Surgical History  She has a past surgical history that includes Other surgical history (09/09/2019); Other surgical history (09/26/2022); and Appendectomy (04/28/2025).     Social History  She reports that she quit smoking about 8 years ago. Her smoking use included cigarettes. She started smoking about 58 years ago. She has a 50 pack-year smoking history. She has never used smokeless tobacco. She reports that she does not currently use alcohol. She reports that she does not use drugs.    Family History  No family history on file.     Allergies  Acetaminophen, Penicillins, and Sulfa (sulfonamide antibiotics)    Medications  Current Outpatient Medications   Medication Sig Dispense Refill    albuterol 90 mcg/actuation inhaler Inhale 2 puffs every 6 hours if needed for wheezing. 6.7 g 10    alendronate (Fosamax) 70 mg tablet TAKE 1 TABLET (70 MG) BY MOUTH EVERY 7 DAYS IN THE MORNING WITH A FULL GLASS OF WATER ON AN EMPTY STOMACH, DONT TAKE ANYTHING ELSE OR LIE DOWN 30 MIN 4 tablet 2    Allergy Relief, fexofenadine, 180 mg tablet TAKE 1 TABLET BY MOUTH ONCE DAILY 30 tablet 10    atorvastatin (Lipitor) 10 mg tablet Take 1 tablet (10 mg) by mouth once daily. 90 tablet 3    calcium carbonate-vitamin D3 600 mg-5 mcg (200 unit) tablet Take 2 tablets by mouth once daily. 60 tablet 11    capecitabine (Xeloda) 500 mg tablet Take 2 tablets (1,000 mg total) by mouth twice a day.      DULoxetine (Cymbalta) 60 mg DR capsule Take 1 capsule (60 mg) by mouth once daily. Do not crush or chew. 30 capsule 3    fluticasone (Flonase) 50 mcg/actuation nasal spray Administer 1 spray into each nostril once daily. 9.9 mL 11    fluticasone  "propion-salmeteroL (Advair Diskus) 250-50 mcg/dose diskus inhaler INHALE 1 PUFF BY MOUTH EVERY 12 HOURS 60 each 11    insulin glargine (Lantus Solostar U-100 Insulin) 100 unit/mL (3 mL) pen  100 each 3    lidocaine (Lidoderm) 5 % patch APPLY 1 PATCH TOPICALLY TO THE SKIN OF PAINFUL AREA ONCE DAILY OVER 12 HOURS PER DAY, REMOVE FOR 12 HOURS. 30 patch 11    lidocaine 4 % patch Place 1 patch on the skin once daily. Remove & discard patch within 12 hours or as directed by MD.      lisinopril 10 mg tablet TAKE 1 TABLET BY MOUTH ONCE EVERY 24 HOURS 30 tablet 10    metFORMIN (Glucophage) 1,000 mg tablet TAKE 1 TABLET BY MOUTH TWICE DAILY WITH MEALS 60 tablet 10    OneTouch Verio test strips strip USE TO TEST BLOOD SUGAR TWICE DAILY 100 strip 10    oxyCODONE (Roxicodone) 5 mg immediate release tablet Take 1 tablet (5 mg) by mouth every 8 hours if needed for severe pain (7 - 10). Take ONE-HALF to 1 tablet by mouth every 8 hours as needed for up to 3 days. 15 tablet 0    pen needle, diabetic (Novofine 32) 32 gauge x 1/4\" needle 1 each by abdominal subcutaneous route once daily. 100 each 3    tiotropium (Spiriva) 18 mcg inhalation capsule Place 1 capsule (18 mcg) into inhaler and inhale once daily. 30 capsule 3     No current facility-administered medications for this visit.       Review of Systems    REVIEW OF SYSTEMS  GENERAL:  Negative for malaise, significant weight loss, fever  CARDIOVASCULAR: leg swelling   MUSCULOSKELETAL:  Negative for joint pain or swelling, back pain, and muscle pain.  SKIN:  Negative for lesions, rash, and itching  PSYCH:  Negative for sleep disturbance, mood disorder and recent psychosocial stressors  NEURO: Negative, denies any burning, tingling or numbness     Objective: frail,   Vasc: DP and PT pulses are palpable bilateral.  CFT is less than 3 seconds bilateral.  Skin temperature is warm to cool proximal to distal bilateral.      Neuro:  Light touch is intact to the foot bilateral.  No clonus   "   Derm: Nails 1-5 bilateral are thickened, elongated and crumbly with subungual debris. Ingrown first bilateral. Skin is supple with normal texture and turgor noted.  Webspaces are clean, dry and intact bilateral.    Ortho: Muscle strength is 5/5 for all pedal groups tested.  Ankle joint, subtalar joint, 1st MPJ and lesser MPJ ROM is full and without pain or crepitus.  The foot type is rectus bilateral off weight bearing.  There are no structural deformities noted.    Assessment/Plan     DM  Painful nail mycosis    Toenails are debrided in length and thickness to avoid infection and for pain relief              Detail Level: Generalized Detail Level: Detailed Detail Level: Simple Detail Level: Zone

## 2025-08-04 NOTE — PROGRESS NOTES
Confirmation of at least 2 patient identifiers.    Completed telephonic follow-up with patient after recent visit with Dr. Berumen 7/30/2025.      Spoke to relative pt daughter Halley during outreach call.    Halley reports that pt is still having pain but is starting to feel better.  She reports pt has her medication and everything she needs at home.    Halley mentions pt is receiving weekly visits from a home health care nurse with [facility]. She would like to know if physical therapy could be added for pt. Message sent to Dr. Berumen and office clinical staff via [employer].    Halley denies any other questions or needs at this time.    Verified pt next PCP appt 9/22/2025.    She is encouraged to call if questions or needs arise.    Next care management follow-up approximately within one month.

## 2025-08-08 ENCOUNTER — TELEPHONE (OUTPATIENT)
Dept: PRIMARY CARE | Facility: CLINIC | Age: 89
End: 2025-08-08
Payer: MEDICARE

## 2025-08-11 ENCOUNTER — APPOINTMENT (OUTPATIENT)
Dept: PRIMARY CARE | Facility: CLINIC | Age: 89
End: 2025-08-11
Payer: MEDICARE

## 2025-08-19 ENCOUNTER — TELEPHONE (OUTPATIENT)
Dept: PRIMARY CARE | Facility: CLINIC | Age: 89
End: 2025-08-19
Payer: MEDICARE

## 2025-08-20 DIAGNOSIS — M79.604 PAIN IN BOTH LOWER EXTREMITIES: ICD-10-CM

## 2025-08-20 DIAGNOSIS — M54.40 ACUTE BILATERAL LOW BACK PAIN WITH SCIATICA, SCIATICA LATERALITY UNSPECIFIED: ICD-10-CM

## 2025-08-20 DIAGNOSIS — M79.605 PAIN IN BOTH LOWER EXTREMITIES: ICD-10-CM

## 2025-08-20 RX ORDER — DULOXETIN HYDROCHLORIDE 60 MG/1
60 CAPSULE, DELAYED RELEASE ORAL DAILY
Qty: 30 CAPSULE | Refills: 3 | Status: SHIPPED | OUTPATIENT
Start: 2025-08-20 | End: 2026-02-16

## 2025-08-21 ENCOUNTER — TELEPHONE (OUTPATIENT)
Facility: CLINIC | Age: 89
End: 2025-08-21
Payer: MEDICARE

## 2025-08-27 ENCOUNTER — APPOINTMENT (OUTPATIENT)
Dept: PHARMACY | Facility: HOSPITAL | Age: 89
End: 2025-08-27
Payer: MEDICARE

## 2025-08-27 DIAGNOSIS — J43.8 OTHER EMPHYSEMA (MULTI): ICD-10-CM

## 2025-08-27 DIAGNOSIS — Z99.81 DEPENDENCE ON SUPPLEMENTAL OXYGEN: ICD-10-CM

## 2025-09-02 DIAGNOSIS — M85.80 OSTEOPENIA, UNSPECIFIED LOCATION: ICD-10-CM

## 2025-09-03 RX ORDER — ALENDRONATE SODIUM 70 MG/1
TABLET ORAL
Qty: 4 TABLET | Refills: 11 | Status: SHIPPED | OUTPATIENT
Start: 2025-09-03

## 2025-09-04 ENCOUNTER — PATIENT OUTREACH (OUTPATIENT)
Dept: PRIMARY CARE | Facility: CLINIC | Age: 89
End: 2025-09-04
Payer: MEDICARE

## 2025-09-22 ENCOUNTER — APPOINTMENT (OUTPATIENT)
Dept: PRIMARY CARE | Facility: CLINIC | Age: 89
End: 2025-09-22
Payer: MEDICARE

## 2025-10-02 ENCOUNTER — APPOINTMENT (OUTPATIENT)
Facility: CLINIC | Age: 89
End: 2025-10-02
Payer: MEDICARE

## 2025-10-06 ENCOUNTER — APPOINTMENT (OUTPATIENT)
Dept: PODIATRY | Facility: CLINIC | Age: 89
End: 2025-10-06
Payer: MEDICARE

## 2025-10-08 ENCOUNTER — APPOINTMENT (OUTPATIENT)
Dept: PHARMACY | Facility: HOSPITAL | Age: 89
End: 2025-10-08
Payer: MEDICARE

## 2026-06-15 ENCOUNTER — APPOINTMENT (OUTPATIENT)
Dept: PRIMARY CARE | Facility: CLINIC | Age: 89
End: 2026-06-15
Payer: MEDICARE